# Patient Record
Sex: MALE | URBAN - METROPOLITAN AREA
[De-identification: names, ages, dates, MRNs, and addresses within clinical notes are randomized per-mention and may not be internally consistent; named-entity substitution may affect disease eponyms.]

---

## 2023-08-09 ENCOUNTER — APPOINTMENT (OUTPATIENT)
Dept: URBAN - METROPOLITAN AREA CLINIC 259 | Age: 83
Setting detail: DERMATOLOGY
End: 2023-08-09

## 2023-08-09 PROBLEM — C44.319 BASAL CELL CARCINOMA OF SKIN OF OTHER PARTS OF FACE: Status: ACTIVE | Noted: 2023-08-09

## 2023-08-09 PROBLEM — C44.311 BASAL CELL CARCINOMA OF SKIN OF NOSE: Status: ACTIVE | Noted: 2023-08-09

## 2023-08-09 PROCEDURE — OTHER MIPS QUALITY: OTHER

## 2023-08-09 PROCEDURE — 99203 OFFICE O/P NEW LOW 30 MIN: CPT

## 2023-08-09 PROCEDURE — OTHER DEFER: OTHER

## 2023-08-09 PROCEDURE — OTHER COUNSELING: OTHER

## 2023-08-09 NOTE — PROCEDURE: DEFER
Introduction Text (Please End With A Colon): The following procedure was deferred:
X Size Of Lesion In Cm (Optional): 0
Detail Level: Detailed
Other Procedure: SRT

## 2023-08-09 NOTE — PROCEDURE: MIPS QUALITY
Quality 111:Pneumonia Vaccination Status For Older Adults: Patient received any pneumococcal conjugate or polysaccharide vaccine on or after their 60th birthday and before the end of the measurement period
Detail Level: Detailed
Quality 226: Preventive Care And Screening: Tobacco Use: Screening And Cessation Intervention: Patient screened for tobacco use and is an ex/non-smoker
Quality 110: Preventive Care And Screening: Influenza Immunization: Influenza Immunization previously received during influenza season
Quality 431: Preventive Care And Screening: Unhealthy Alcohol Use - Screening: Patient not identified as an unhealthy alcohol user when screened for unhealthy alcohol use using a systematic screening method
Quality 130: Documentation Of Current Medications In The Medical Record: Current Medications Documented

## 2023-08-28 ENCOUNTER — APPOINTMENT (OUTPATIENT)
Dept: URBAN - METROPOLITAN AREA CLINIC 259 | Age: 83
Setting detail: DERMATOLOGY
End: 2023-08-29

## 2023-08-28 PROBLEM — C44.319 BASAL CELL CARCINOMA OF SKIN OF OTHER PARTS OF FACE: Status: ACTIVE | Noted: 2023-08-28

## 2023-08-28 PROBLEM — C44.311 BASAL CELL CARCINOMA OF SKIN OF NOSE: Status: ACTIVE | Noted: 2023-08-28

## 2023-08-28 PROBLEM — C44.399 OTHER SPECIFIED MALIGNANT NEOPLASM OF SKIN OF OTHER PARTS OF FACE: Status: ACTIVE | Noted: 2023-08-28

## 2023-08-28 PROBLEM — C44.01 BASAL CELL CARCINOMA OF SKIN OF LIP: Status: ACTIVE | Noted: 2023-08-28

## 2023-08-28 PROCEDURE — OTHER IMAGE GUIDED - SUPERFICIAL RADIOTHERAPY: TREATMENT VISIT: OTHER

## 2023-08-28 PROCEDURE — G6001 ECHO GUIDANCE RADIOTHERAPY: HCPCS

## 2023-08-28 PROCEDURE — 77300 RADIATION THERAPY DOSE PLAN: CPT

## 2023-08-28 PROCEDURE — 77290 THER RAD SIMULAJ FIELD CPLX: CPT

## 2023-08-28 PROCEDURE — OTHER IMAGE GUIDED - SUPERFICIAL RADIOTHERAPY: OTHER

## 2023-08-28 PROCEDURE — 77263 THER RADIOLOGY TX PLNG CPLX: CPT

## 2023-08-28 PROCEDURE — OTHER IMAGE GUIDED - SUPERFICIAL RADIOTHERAPY: EVALUATION VISIT: OTHER

## 2023-08-28 PROCEDURE — OTHER IMAGE GUIDED - SUPERFICIAL RADIOTHERAPY: SIMULATION VISIT: OTHER

## 2023-08-28 PROCEDURE — 77333 RADIATION TREATMENT AID(S): CPT

## 2023-08-28 PROCEDURE — 99213 OFFICE O/P EST LOW 20 MIN: CPT | Mod: 25

## 2023-08-28 NOTE — PROCEDURE: IMAGE GUIDED - SUPERFICIAL RADIOTHERAPY: TREATMENT VISIT
Calculate Total Cumulative Dose Automatically Or Manually: Manually
Show Ultrasound In Note?: Yes
Total Cumulative Dose (Cgy): 269.10
Additional Change To Daily Dosage Administered Mid Treatment?: No
Add X Modifier?: JURADO - Unusual Non-Overlapping Service
Ultrasound Used Text: High frequency ultrasound depth is mm, which is mm in difference from previous imaging.
Prescription Used: 1
Ultrasound Not Used Text: Ultrasound was not performed today due to
Treatment Documentation: This patient has been treated today with image-guided superficial radiation therapy for non-melanoma skin cancer. Written informed consent has been previously obtained from this patient for this treatment. This consent is documented in the patient's chart. The patient gave verbal consent to continue treatment today. The patient was treated with a specific radiation dose and setup as prescribed by the provider listed on this visit note.  A Radiation Therapist performed administration of radiation under the supervision of a provider. The treatment parameters and cumulative dose are indicated above. Prior to administering the radiation, the patient underwent a verification therapeutic radiology simulation-aided field setting defining relevant normal and abnormal target anatomy and acquiring images with a separate and distinct diagnostic high-frequency ultrasound to delineate tissues and determine whether to proceed with delivery of therapeutic, in addition to retrieve data necessary to develop an optimal radiation treatment process for the patient. The field placement simulation documents any change seen in overall tumor volume documented in the patient’s record, allows the clinician to indicate any needed changes in the treatment plan and/or prescription, provides diagnostic evaluation as the basis for performing the therapeutic procedure, and clearly identifies the information needed to decide to proceed with the therapeutic procedure. This process includes verification of the treatment port(s) and proper treatment positioning. All treatment ports were photographed within electronic medical records. The patient's lead blocking along with gross tumor volume and margin was confirmed. Considering superficial radiotherapy is clinical in setup, this requires the physician and radiation therapist to clarify the location interest being treated against initial images, ultrasound, pathology, and patient anatomy. Care was taken to ensure laboy treated were geometrically accurate and properly positioned using therapeutic radiology simulation-aided field setting verification per fraction. This process is also utilized to determine if any prescription or setup changes are necessary. These steps are therefore medically necessary to ensure safe and effective administration of radiation. Ongoing therapeutic radiology simulation-aided field setting verification is ordered throughout the course of therapy.  \\n\\nA high-frequency ultrasound image was acquired today for a two-dimensional evaluation of the tumor volume, depth, width, breadth, review of prior response to treatment, provide geometric accuracy of field placement, and determine whether to proceed with therapeutic delivery.\\n\\nThe field placement and ultrasound imaging, per fraction, is separate and distinct from the initial simulation and is an important task in providing safe administration of superficial radiation therapy. Physician evaluation of the ultrasound information will be ongoing throughout the course of treatment and is deemed medically necessary to ensure the efficacy of treatment, whether to proceed with therapeutic delivery, and determine any necessary changes. Today's images were evaluated for determination of continuation of treatment with the current plan or with necessary changes as appropriate. Additionally, the use of ultrasound visualization and targeted assessment allows the patient to be able to see his or her cancer(s) progress, encouraging the patient to complete and maintain compliance through the full course of prescribed radiotherapy. Per Dr. Ospina, continued ultrasound guidance and therapeutic radiology simulation-aided field setting verification per fraction is required for field placement, measurement of tumor depth, tissues evaluation, progress, acute effect monitoring, and determination for therapeutic treatment delivery is appropriate.
Energy (Kv): 100
Treatment Documentation: This patient has been treated today with image-guided superficial radiation therapy for non-melanoma skin cancer. Written informed consent has been previously obtained from this patient for this treatment. This consent is documented in the patient's chart. The patient gave verbal consent to continue treatment today. The patient was treated with a specific radiation dose and setup as prescribed by the provider listed on this visit note.  A Radiation Therapist performed administration of radiation under the supervision of a provider. The treatment parameters and cumulative dose are indicated above. Prior to administering the radiation, the patient underwent a verification therapeutic radiology simulation-aided field setting defining relevant normal and abnormal target anatomy and acquiring images with a separate and distinct diagnostic high-frequency ultrasound to delineate tissues and determine whether to proceed with delivery of therapeutic, in addition to retrieve data necessary to develop an optimal radiation treatment process for the patient. The field placement simulation documents any change seen in overall tumor volume documented in the patient’s record, allows the clinician to indicate any needed changes in the treatment plan and/or prescription, provides diagnostic evaluation as the basis for performing the therapeutic procedure, and clearly identifies the information needed to decide to proceed with the therapeutic procedure. This process includes verification of the treatment port(s) and proper treatment positioning. All treatment ports were photographed within electronic medical records. The patient's lead blocking along with gross tumor volume and margin was confirmed. Considering superficial radiotherapy is clinical in setup, this requires the physician and radiation therapist to clarify the location interest being treated against initial images, ultrasound, pathology, and patient anatomy. Care was taken to ensure laboy treated were geometrically accurate and properly positioned using therapeutic radiology simulation-aided field setting verification per fraction. This process is also utilized to determine if any prescription or setup changes are necessary. These steps are therefore medically necessary to ensure safe and effective administration of radiation. Ongoing therapeutic radiology simulation-aided field setting verification is ordered throughout the course of therapy.  \\n\\nA high-frequency ultrasound image was acquired today for a two-dimensional evaluation of the tumor volume, depth, width, breadth, review of prior response to treatment, provide geometric accuracy of field placement, and determine whether to proceed with therapeutic delivery.\\n\\nThe field placement and ultrasound imaging, per fraction, is separate and distinct from the initial simulation and is an important task in providing safe administration of superficial radiation therapy. Physician evaluation of the ultrasound information will be ongoing throughout the course of treatment and is deemed medically necessary to ensure the efficacy of treatment, whether to proceed with therapeutic delivery, and determine any necessary changes. Today's images were evaluated for determination of continuation of treatment with the current plan or with necessary changes as appropriate. Additionally, the use of ultrasound visualization and targeted assessment allows the patient to be able to see his or her cancer(s) progress, encouraging the patient to complete and maintain compliance through the full course of prescribed radiotherapy. Per Dr. Ospina, continued ultrasound guidance and therapeutic radiology simulation-aided field setting verification per fraction is required for field placement, measurement of tumor depth, tissues evaluation, progress, acute effect monitoring, and determination for therapeutic treatment delivery is appropriate.
Daily Dosage (Cgy): 267.46
Daily Dosage (Cgy): 264.04
Energy (Kv): 70
Treatment Documentation: This patient has been treated today with image-guided superficial radiation therapy for non-melanoma skin cancer. Written informed consent has been previously obtained from this patient for this treatment. This consent is documented in the patient's chart. The patient gave verbal consent to continue treatment today. The patient was treated with a specific radiation dose and setup as prescribed by the provider listed on this visit note.  A Radiation Therapist performed administration of radiation under the supervision of a provider. The treatment parameters and cumulative dose are indicated above. Prior to administering the radiation, the patient underwent a verification therapeutic radiology simulation-aided field setting defining relevant normal and abnormal target anatomy and acquiring images with a separate and distinct diagnostic high-frequency ultrasound to delineate tissues and determine whether to proceed with delivery of therapeutic, in addition to retrieve data necessary to develop an optimal radiation treatment process for the patient. The field placement simulation documents any change seen in overall tumor volume documented in the patient’s record, allows the clinician to indicate any needed changes in the treatment plan and/or prescription, provides diagnostic evaluation as the basis for performing the therapeutic procedure, and clearly identifies the information needed to decide to proceed with the therapeutic procedure. This process includes verification of the treatment port(s) and proper treatment positioning. All treatment ports were photographed within electronic medical records. The patient's lead blocking along with gross tumor volume and margin was confirmed. Considering superficial radiotherapy is clinical in setup, this requires the physician and radiation therapist to clarify the location interest being treated against initial images, ultrasound, pathology, and patient anatomy. Care was taken to ensure laboy treated were geometrically accurate and properly positioned using therapeutic radiology simulation-aided field setting verification per fraction. This process is also utilized to determine if any prescription or setup changes are necessary. These steps are therefore medically necessary to ensure safe and effective administration of radiation. Ongoing therapeutic radiology simulation-aided field setting verification is ordered throughout the course of therapy.  \\n\\nA high-frequency ultrasound image was acquired today for a two-dimensional evaluation of the tumor volume, depth, width, breadth, review of prior response to treatment, provide geometric accuracy of field placement, and determine whether to proceed with therapeutic delivery.\\n\\nThe field placement and ultrasound imaging, per fraction, is separate and distinct from the initial simulation and is an important task in providing safe administration of superficial radiation therapy. Physician evaluation of the ultrasound information will be ongoing throughout the course of treatment and is deemed medically necessary to ensure the efficacy of treatment, whether to proceed with therapeutic delivery, and determine any necessary changes. Today's images were evaluated for determination of continuation of treatment with the current plan or with necessary changes as appropriate. Additionally, the use of ultrasound visualization and targeted assessment allows the patient to be able to see his or her cancer(s) progress, encouraging the patient to complete and maintain compliance through the full course of prescribed radiotherapy. Per Dr. Ospina, continued ultrasound guidance and therapeutic radiology simulation-aided field setting verification per fraction is required for field placement, measurement of tumor depth, tissues evaluation, progress, acute effect monitoring, and determination for therapeutic treatment delivery is appropriate.

## 2023-08-28 NOTE — PROCEDURE: IMAGE GUIDED - SUPERFICIAL RADIOTHERAPY: SIMULATION VISIT
Simulation Documentation: Per the request of Dr. Ospina, the patient was seen today for Superficial Radiation Therapy planning requiring initial simulation in preparation for treatment of specific diseased sites. Simulation is necessary to determine the correct patient and treatment portal positioning, to deliver safe and effective radiotherapy. A high-frequency ultrasound image was acquired prior to treatment today for two- dimensional evaluation of tumor volume and will be repeated per fraction for response to treatment, in addition, to geometric accuracy of field placement. Physician evaluation of the ultrasound tumor depth, width, and breadth will be ongoing through the course of treatment and is deemed medically necessary ensuring efficacy of treatment and determine whether to proceed with delivery of therapeutic. Today’s image and setup were evaluated to determine the initiation of treatment. All appropriate blocking and treatment parameters were verified by the radiation therapist and provider.  \\n\\nPer Dr. Ospina, continued per fraction ultrasound guidance and simulation are required for field placement, measurement of tumor depth, width and breadth, progress, and edema monitoring.   \\n\\nPer the request of Dr. Ospina, continuing medical physics review as per radiotherapy standard of care post every 5th fraction for the patient, including assessment of treatment parameters,  of dose delivery, and review of patient treatment documentation in support of the provider, is ordered, ensuring efficacy and continued safe delivery of radiotherapy. Included in the physics check is a review of patient setup information, all pertinent simulation and treatment photograph(s) checks, prescription, dose calculation verification, per fraction dose charted correctly, elapsed days and treatment days correctly charted, cumulative dose correct, and review of any prescription changes. Continued medical physics review post every 5th fraction of radiotherapy is requested by the provider for appropriate radiotherapy management and is deemed medically necessary and standard of care.

## 2023-08-28 NOTE — PROCEDURE: IMAGE GUIDED - SUPERFICIAL RADIOTHERAPY: EVALUATION VISIT
Was Ultrasound Performed Today?: No
Ultrasound Used Text: Ultrasound depth is mm.
Assessment: Appropriate reaction
Evaluation Plan: The patient is undergoing superficial radiation therapy for skin cancer and presents for weekly evaluation and management. Per protocol and as documented on the flow sheet, the patient was questioned as to subjective redness, pruritus, pain, drainage, fatigue, or any other symptoms. Objectively, the radiation area was evaluated with regards to erythema, atrophy, scale, crusting, erosion, ulceration, edema, purpura, tenderness, warmth, drainage, and any other findings. The plan was extensively reviewed including dose and dosing schedule. The simulation and clinical setup were also reviewed as were external and any internal shields and based on this review the appropriateness and sufficiency of treatment was determined.
Is This Visit For Evaluation During Treatment Or Follow Up Post Treatment?: evaluation
Radiation Therapy Oncology Group (Rtog) Score: 0
Ultrasound Not Used Text: Ultrasound was not performed today due to

## 2023-08-28 NOTE — PROCEDURE: IMAGE GUIDED - SUPERFICIAL RADIOTHERAPY: SIMULATION VISIT
Bill For Treatment Planning: Yes- (Complex Planning- 3 Sites or more: [id number]) Bill For Treatment Planning: Yes- (Complex Planning- 3 Sites or more: [id number])

## 2023-08-28 NOTE — PROCEDURE: IMAGE GUIDED - SUPERFICIAL RADIOTHERAPY
Body Location Override (Optional): Left Nasal Ala
Detail Level: Detailed
Pathology Override (Pathology Will Render As Diagnosis Name If Left Blank): Primary Infiltrative Basal Cell Carcinoma
Field Number: 1
Lesion Dimensions-X Axis In Cm: 0.8
Lesion Dimensions-Y Axis In Cm: 0.9
Lesion Margin Size In Cm: 0.5
Applicator Size In Cm: 1.5 cm
Energy (Kv): 100
Treatment Time (Min): 0.46
Time, Dose, Fractionation Factor (Tdf) For Prescription 1: 99
Daily Dose (Cgy): 269.10
Number Of Fractions For Prescription 1: 20
Treatments Per Week: 4
Total Dose For Prescription 1 (Cgy): 5382.00
Add A Second Prescription?: No
Additional Fraction(S) Needed:: 0
Bill For Physics Consultation: No - Render Text Only
Physics Documentation: (Physics Check Verbiage)
Body Location Override (Optional): Left Central Zygoma
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
Field Number: 2
Lesion Dimensions-X Axis In Cm: 0.7
Shield Size In Cm: 1.7 X 1.7
Applicator Size In Cm: 2.5 cm
Treatment Time (Min): 0.43
Time, Dose, Fractionation Factor (Tdf) For Prescription 1: 98
Daily Dose (Cgy): 267.46
Total Dose For Prescription 1 (Cgy): 5349.20
Body Location Override (Optional): Left Superior Medial Forehead
Pathology Override (Pathology Will Render As Diagnosis Name If Left Blank): Primary Nodular Basal Cell Carcinoma
Field Number: 3
Shield Size In Cm: 2.0 x 2.0
Energy (Kv): 70
Treatment Time (Min): 0.41
Time, Dose, Fractionation Factor (Tdf) For Prescription 1: 96
Daily Dose (Cgy): 264.04
Total Dose For Prescription 1 (Cgy): 5280.80
Body Location Override (Optional): Right Nasolabial Fold

## 2023-08-29 ENCOUNTER — APPOINTMENT (OUTPATIENT)
Dept: URBAN - METROPOLITAN AREA CLINIC 259 | Age: 83
Setting detail: DERMATOLOGY
End: 2023-08-31

## 2023-08-29 PROBLEM — C44.01 BASAL CELL CARCINOMA OF SKIN OF LIP: Status: ACTIVE | Noted: 2023-08-29

## 2023-08-29 PROBLEM — C44.311 BASAL CELL CARCINOMA OF SKIN OF NOSE: Status: ACTIVE | Noted: 2023-08-29

## 2023-08-29 PROBLEM — C44.319 BASAL CELL CARCINOMA OF SKIN OF OTHER PARTS OF FACE: Status: ACTIVE | Noted: 2023-08-29

## 2023-08-29 PROBLEM — C44.399 OTHER SPECIFIED MALIGNANT NEOPLASM OF SKIN OF OTHER PARTS OF FACE: Status: ACTIVE | Noted: 2023-08-29

## 2023-08-29 PROCEDURE — OTHER IMAGE GUIDED - SUPERFICIAL RADIOTHERAPY: TREATMENT VISIT: OTHER

## 2023-08-29 PROCEDURE — G6001 ECHO GUIDANCE RADIOTHERAPY: HCPCS | Mod: XU

## 2023-08-29 PROCEDURE — 77401 RADIATION TX DELIVERY SUPFC: CPT

## 2023-08-29 PROCEDURE — 77280 THER RAD SIMULAJ FIELD SMPL: CPT

## 2023-08-29 PROCEDURE — OTHER IMAGE GUIDED - SUPERFICIAL RADIOTHERAPY: OTHER

## 2023-08-29 NOTE — PROCEDURE: IMAGE GUIDED - SUPERFICIAL RADIOTHERAPY: TREATMENT VISIT
Calculate Total Cumulative Dose Automatically Or Manually: Manually
Show Ultrasound In Note?: Yes
Total Cumulative Dose (Cgy): 269.10
Additional Change To Daily Dosage Administered Mid Treatment?: No
Add X Modifier?: JURADO - Unusual Non-Overlapping Service
Ultrasound Used Text: High frequency ultrasound depth is 2.52 mm, which is 0.06 mm in difference from previous imaging.
Prescription Used: 1
Ultrasound Not Used Text: Ultrasound was not performed today due to
Treatment Documentation: This patient has been treated today with image-guided superficial radiation therapy for non-melanoma skin cancer. Written informed consent has been previously obtained from this patient for this treatment. This consent is documented in the patient's chart. The patient gave verbal consent to continue treatment today. The patient was treated with a specific radiation dose and setup as prescribed by the provider listed on this visit note.  A Radiation Therapist performed administration of radiation under the supervision of a provider. The treatment parameters and cumulative dose are indicated above. Prior to administering the radiation, the patient underwent a verification therapeutic radiology simulation-aided field setting defining relevant normal and abnormal target anatomy and acquiring images with a separate and distinct diagnostic high-frequency ultrasound to delineate tissues and determine whether to proceed with delivery of therapeutic, in addition to retrieve data necessary to develop an optimal radiation treatment process for the patient. The field placement simulation documents any change seen in overall tumor volume documented in the patient’s record, allows the clinician to indicate any needed changes in the treatment plan and/or prescription, provides diagnostic evaluation as the basis for performing the therapeutic procedure, and clearly identifies the information needed to decide to proceed with the therapeutic procedure. This process includes verification of the treatment port(s) and proper treatment positioning. All treatment ports were photographed within electronic medical records. The patient's lead blocking along with gross tumor volume and margin was confirmed. Considering superficial radiotherapy is clinical in setup, this requires the physician and radiation therapist to clarify the location interest being treated against initial images, ultrasound, pathology, and patient anatomy. Care was taken to ensure laboy treated were geometrically accurate and properly positioned using therapeutic radiology simulation-aided field setting verification per fraction. This process is also utilized to determine if any prescription or setup changes are necessary. These steps are therefore medically necessary to ensure safe and effective administration of radiation. Ongoing therapeutic radiology simulation-aided field setting verification is ordered throughout the course of therapy.  \\n\\nA high-frequency ultrasound image was acquired today for a two-dimensional evaluation of the tumor volume, depth, width, breadth, review of prior response to treatment, provide geometric accuracy of field placement, and determine whether to proceed with therapeutic delivery.\\n\\nThe field placement and ultrasound imaging, per fraction, is separate and distinct from the initial simulation and is an important task in providing safe administration of superficial radiation therapy. Physician evaluation of the ultrasound information will be ongoing throughout the course of treatment and is deemed medically necessary to ensure the efficacy of treatment, whether to proceed with therapeutic delivery, and determine any necessary changes. Today's images were evaluated for determination of continuation of treatment with the current plan or with necessary changes as appropriate. Additionally, the use of ultrasound visualization and targeted assessment allows the patient to be able to see his or her cancer(s) progress, encouraging the patient to complete and maintain compliance through the full course of prescribed radiotherapy. Per Dr. Ospina, continued ultrasound guidance and therapeutic radiology simulation-aided field setting verification per fraction is required for field placement, measurement of tumor depth, tissues evaluation, progress, acute effect monitoring, and determination for therapeutic treatment delivery is appropriate.
Energy (Kv): 100
Additional Comments (Add Customization Of Note Here): The patient was asked if he has any questions regarding the treatment process. The patient stated that he did not have any questions at this time.
Treatment Documentation: This patient has been treated today with image-guided superficial radiation therapy for non-melanoma skin cancer. Written informed consent has been previously obtained from this patient for this treatment. This consent is documented in the patient's chart. The patient gave verbal consent to continue treatment today. The patient was treated with a specific radiation dose and setup as prescribed by the provider listed on this visit note.  A Radiation Therapist performed administration of radiation under the supervision of a provider. The treatment parameters and cumulative dose are indicated above. Prior to administering the radiation, the patient underwent a verification therapeutic radiology simulation-aided field setting defining relevant normal and abnormal target anatomy and acquiring images with a separate and distinct diagnostic high-frequency ultrasound to delineate tissues and determine whether to proceed with delivery of therapeutic, in addition to retrieve data necessary to develop an optimal radiation treatment process for the patient. The field placement simulation documents any change seen in overall tumor volume documented in the patient’s record, allows the clinician to indicate any needed changes in the treatment plan and/or prescription, provides diagnostic evaluation as the basis for performing the therapeutic procedure, and clearly identifies the information needed to decide to proceed with the therapeutic procedure. This process includes verification of the treatment port(s) and proper treatment positioning. All treatment ports were photographed within electronic medical records. The patient's lead blocking along with gross tumor volume and margin was confirmed. Considering superficial radiotherapy is clinical in setup, this requires the physician and radiation therapist to clarify the location interest being treated against initial images, ultrasound, pathology, and patient anatomy. Care was taken to ensure laboy treated were geometrically accurate and properly positioned using therapeutic radiology simulation-aided field setting verification per fraction. This process is also utilized to determine if any prescription or setup changes are necessary. These steps are therefore medically necessary to ensure safe and effective administration of radiation. Ongoing therapeutic radiology simulation-aided field setting verification is ordered throughout the course of therapy.  \\n\\nA high-frequency ultrasound image was acquired today for a two-dimensional evaluation of the tumor volume, depth, width, breadth, review of prior response to treatment, provide geometric accuracy of field placement, and determine whether to proceed with therapeutic delivery.\\n\\nThe field placement and ultrasound imaging, per fraction, is separate and distinct from the initial simulation and is an important task in providing safe administration of superficial radiation therapy. Physician evaluation of the ultrasound information will be ongoing throughout the course of treatment and is deemed medically necessary to ensure the efficacy of treatment, whether to proceed with therapeutic delivery, and determine any necessary changes. Today's images were evaluated for determination of continuation of treatment with the current plan or with necessary changes as appropriate. Additionally, the use of ultrasound visualization and targeted assessment allows the patient to be able to see his or her cancer(s) progress, encouraging the patient to complete and maintain compliance through the full course of prescribed radiotherapy. Per Dr. Ospina, continued ultrasound guidance and therapeutic radiology simulation-aided field setting verification per fraction is required for field placement, measurement of tumor depth, tissues evaluation, progress, acute effect monitoring, and determination for therapeutic treatment delivery is appropriate.
Daily Dosage (Cgy): 267.46
Ultrasound Used Text: High frequency ultrasound depth is 1.11 mm, which is 0.30 mm in difference from previous imaging.
Daily Dosage (Cgy): 264.04
Energy (Kv): 70
Ultrasound Used Text: High frequency ultrasound depth is 0.96 mm, which is 0.07 mm in difference from previous imaging.
Treatment Documentation: This patient has been treated today with image-guided superficial radiation therapy for non-melanoma skin cancer. Written informed consent has been previously obtained from this patient for this treatment. This consent is documented in the patient's chart. The patient gave verbal consent to continue treatment today. The patient was treated with a specific radiation dose and setup as prescribed by the provider listed on this visit note.  A Radiation Therapist performed administration of radiation under the supervision of a provider. The treatment parameters and cumulative dose are indicated above. Prior to administering the radiation, the patient underwent a verification therapeutic radiology simulation-aided field setting defining relevant normal and abnormal target anatomy and acquiring images with a separate and distinct diagnostic high-frequency ultrasound to delineate tissues and determine whether to proceed with delivery of therapeutic, in addition to retrieve data necessary to develop an optimal radiation treatment process for the patient. The field placement simulation documents any change seen in overall tumor volume documented in the patient’s record, allows the clinician to indicate any needed changes in the treatment plan and/or prescription, provides diagnostic evaluation as the basis for performing the therapeutic procedure, and clearly identifies the information needed to decide to proceed with the therapeutic procedure. This process includes verification of the treatment port(s) and proper treatment positioning. All treatment ports were photographed within electronic medical records. The patient's lead blocking along with gross tumor volume and margin was confirmed. Considering superficial radiotherapy is clinical in setup, this requires the physician and radiation therapist to clarify the location interest being treated against initial images, ultrasound, pathology, and patient anatomy. Care was taken to ensure laboy treated were geometrically accurate and properly positioned using therapeutic radiology simulation-aided field setting verification per fraction. This process is also utilized to determine if any prescription or setup changes are necessary. These steps are therefore medically necessary to ensure safe and effective administration of radiation. Ongoing therapeutic radiology simulation-aided field setting verification is ordered throughout the course of therapy.  \\n\\nA high-frequency ultrasound image was acquired today for a two-dimensional evaluation of the tumor volume, depth, width, breadth, review of prior response to treatment, provide geometric accuracy of field placement, and determine whether to proceed with therapeutic delivery.\\n\\nThe field placement and ultrasound imaging, per fraction, is separate and distinct from the initial simulation and is an important task in providing safe administration of superficial radiation therapy. Physician evaluation of the ultrasound information will be ongoing throughout the course of treatment and is deemed medically necessary to ensure the efficacy of treatment, whether to proceed with therapeutic delivery, and determine any necessary changes. Today's images were evaluated for determination of continuation of treatment with the current plan or with necessary changes as appropriate. Additionally, the use of ultrasound visualization and targeted assessment allows the patient to be able to see his or her cancer(s) progress, encouraging the patient to complete and maintain compliance through the full course of prescribed radiotherapy. Per Dr. Ospina, continued ultrasound guidance and therapeutic radiology simulation-aided field setting verification per fraction is required for field placement, measurement of tumor depth, tissues evaluation, progress, acute effect monitoring, and determination for therapeutic treatment delivery is appropriate.
Treatment Documentation: This patient has been treated today with image-guided superficial radiation therapy for non-melanoma skin cancer. Written informed consent has been previously obtained from this patient for this treatment. This consent is documented in the patient's chart. The patient gave verbal consent to continue treatment today. The patient was treated with a specific radiation dose and setup as prescribed by the provider listed on this visit note.  A Radiation Therapist performed administration of radiation under the supervision of a provider. The treatment parameters and cumulative dose are indicated above. Prior to administering the radiation, the patient underwent a verification therapeutic radiology simulation-aided field setting defining relevant normal and abnormal target anatomy and acquiring images with a separate and distinct diagnostic high-frequency ultrasound to delineate tissues and determine whether to proceed with delivery of therapeutic, in addition to retrieve data necessary to develop an optimal radiation treatment process for the patient. The field placement simulation documents any change seen in overall tumor volume documented in the patient’s record, allows the clinician to indicate any needed changes in the treatment plan and/or prescription, provides diagnostic evaluation as the basis for performing the therapeutic procedure, and clearly identifies the information needed to decide to proceed with the therapeutic procedure. This process includes verification of the treatment port(s) and proper treatment positioning. All treatment ports were photographed within electronic medical records. The patient's lead blocking along with gross tumor volume and margin was confirmed. Considering superficial radiotherapy is clinical in setup, this requires the physician and radiation therapist to clarify the location interest being treated against initial images, ultrasound, pathology, and patient anatomy. Care was taken to ensure laboy treated were geometrically accurate and properly positioned using therapeutic radiology simulation-aided field setting verification per fraction. This process is also utilized to determine if any prescription or setup changes are necessary. These steps are therefore medically necessary to ensure safe and effective administration of radiation. Ongoing therapeutic radiology simulation-aided field setting verification is ordered throughout the course of therapy.  \\n\\nA high-frequency ultrasound image was acquired today for a two-dimensional evaluation of the tumor volume, depth, width, breadth, review of prior response to treatment, provide geometric accuracy of field placement, and determine whether to proceed with therapeutic delivery.\\n\\nThe field placement and ultrasound imaging, per fraction, is separate and distinct from the initial simulation and is an important task in providing safe administration of superficial radiation therapy. Physician evaluation of the ultrasound information will be ongoing throughout the course of treatment and is deemed medically necessary to ensure the efficacy of treatment, whether to proceed with therapeutic delivery, and determine any necessary changes. Today's images were evaluated for determination of continuation of treatment with the current plan or with necessary changes as appropriate. Additionally, the use of ultrasound visualization and targeted assessment allows the patient to be able to see his or her cancer(s) progress, encouraging the patient to complete and maintain compliance through the full course of prescribed radiotherapy. Per Dr. Ospina, continued ultrasound guidance and therapeutic radiology simulation-aided field setting verification per fraction is required for field placement, measurement of tumor depth, tissues evaluation, progress, acute effect monitoring, and determination for therapeutic treatment delivery is appropriate.
Ultrasound Used Text: High frequency ultrasound depth is 1.76 mm, which is 0.26 mm in difference from previous imaging.

## 2023-08-30 ENCOUNTER — APPOINTMENT (OUTPATIENT)
Dept: URBAN - METROPOLITAN AREA CLINIC 259 | Age: 83
Setting detail: DERMATOLOGY
End: 2023-08-31

## 2023-08-30 PROBLEM — C44.311 BASAL CELL CARCINOMA OF SKIN OF NOSE: Status: ACTIVE | Noted: 2023-08-30

## 2023-08-30 PROBLEM — C44.01 BASAL CELL CARCINOMA OF SKIN OF LIP: Status: ACTIVE | Noted: 2023-08-30

## 2023-08-30 PROBLEM — C44.399 OTHER SPECIFIED MALIGNANT NEOPLASM OF SKIN OF OTHER PARTS OF FACE: Status: ACTIVE | Noted: 2023-08-30

## 2023-08-30 PROBLEM — C44.319 BASAL CELL CARCINOMA OF SKIN OF OTHER PARTS OF FACE: Status: ACTIVE | Noted: 2023-08-30

## 2023-08-30 PROCEDURE — OTHER IMAGE GUIDED - SUPERFICIAL RADIOTHERAPY: TREATMENT VISIT: OTHER

## 2023-08-30 PROCEDURE — G6001 ECHO GUIDANCE RADIOTHERAPY: HCPCS | Mod: XU

## 2023-08-30 PROCEDURE — 77280 THER RAD SIMULAJ FIELD SMPL: CPT

## 2023-08-30 PROCEDURE — 77401 RADIATION TX DELIVERY SUPFC: CPT

## 2023-08-30 PROCEDURE — OTHER IMAGE GUIDED - SUPERFICIAL RADIOTHERAPY: OTHER

## 2023-08-30 NOTE — PROCEDURE: IMAGE GUIDED - SUPERFICIAL RADIOTHERAPY: TREATMENT VISIT
Calculate Total Cumulative Dose Automatically Or Manually: Manually
Show Ultrasound In Note?: Yes
Total Cumulative Dose (Cgy): 538.20
Additional Change To Daily Dosage Administered Mid Treatment?: No
Add X Modifier?: JURADO - Unusual Non-Overlapping Service
Ultrasound Used Text: High frequency ultrasound depth is 2.02 mm, which is 0.50 mm in difference from previous imaging.
Prescription Used: 1
Ultrasound Not Used Text: Ultrasound was not performed today due to
Fraction Number: 2
Treatment Documentation: This patient has been treated today with image-guided superficial radiation therapy for non-melanoma skin cancer. Written informed consent has been previously obtained from this patient for this treatment. This consent is documented in the patient's chart. The patient gave verbal consent to continue treatment today. The patient was treated with a specific radiation dose and setup as prescribed by the provider listed on this visit note.  A Radiation Therapist performed administration of radiation under the supervision of a provider. The treatment parameters and cumulative dose are indicated above. Prior to administering the radiation, the patient underwent a verification therapeutic radiology simulation-aided field setting defining relevant normal and abnormal target anatomy and acquiring images with a separate and distinct diagnostic high-frequency ultrasound to delineate tissues and determine whether to proceed with delivery of therapeutic, in addition to retrieve data necessary to develop an optimal radiation treatment process for the patient. The field placement simulation documents any change seen in overall tumor volume documented in the patient’s record, allows the clinician to indicate any needed changes in the treatment plan and/or prescription, provides diagnostic evaluation as the basis for performing the therapeutic procedure, and clearly identifies the information needed to decide to proceed with the therapeutic procedure. This process includes verification of the treatment port(s) and proper treatment positioning. All treatment ports were photographed within electronic medical records. The patient's lead blocking along with gross tumor volume and margin was confirmed. Considering superficial radiotherapy is clinical in setup, this requires the physician and radiation therapist to clarify the location interest being treated against initial images, ultrasound, pathology, and patient anatomy. Care was taken to ensure laboy treated were geometrically accurate and properly positioned using therapeutic radiology simulation-aided field setting verification per fraction. This process is also utilized to determine if any prescription or setup changes are necessary. These steps are therefore medically necessary to ensure safe and effective administration of radiation. Ongoing therapeutic radiology simulation-aided field setting verification is ordered throughout the course of therapy.  \\n\\nA high-frequency ultrasound image was acquired today for a two-dimensional evaluation of the tumor volume, depth, width, breadth, review of prior response to treatment, provide geometric accuracy of field placement, and determine whether to proceed with therapeutic delivery.\\n\\nThe field placement and ultrasound imaging, per fraction, is separate and distinct from the initial simulation and is an important task in providing safe administration of superficial radiation therapy. Physician evaluation of the ultrasound information will be ongoing throughout the course of treatment and is deemed medically necessary to ensure the efficacy of treatment, whether to proceed with therapeutic delivery, and determine any necessary changes. Today's images were evaluated for determination of continuation of treatment with the current plan or with necessary changes as appropriate. Additionally, the use of ultrasound visualization and targeted assessment allows the patient to be able to see his or her cancer(s) progress, encouraging the patient to complete and maintain compliance through the full course of prescribed radiotherapy. Per Dr. Ospina, continued ultrasound guidance and therapeutic radiology simulation-aided field setting verification per fraction is required for field placement, measurement of tumor depth, tissues evaluation, progress, acute effect monitoring, and determination for therapeutic treatment delivery is appropriate.
Energy (Kv): 100
Daily Dosage (Cgy): 269.10
Additional Comments (Add Customization Of Note Here): The patient stated he started using Vaseline on his treatment site yesterday. I encouraged him to use Vaseline daily on the treatment sites.
Treatment Documentation: This patient has been treated today with image-guided superficial radiation therapy for non-melanoma skin cancer. Written informed consent has been previously obtained from this patient for this treatment. This consent is documented in the patient's chart. The patient gave verbal consent to continue treatment today. The patient was treated with a specific radiation dose and setup as prescribed by the provider listed on this visit note.  A Radiation Therapist performed administration of radiation under the supervision of a provider. The treatment parameters and cumulative dose are indicated above. Prior to administering the radiation, the patient underwent a verification therapeutic radiology simulation-aided field setting defining relevant normal and abnormal target anatomy and acquiring images with a separate and distinct diagnostic high-frequency ultrasound to delineate tissues and determine whether to proceed with delivery of therapeutic, in addition to retrieve data necessary to develop an optimal radiation treatment process for the patient. The field placement simulation documents any change seen in overall tumor volume documented in the patient’s record, allows the clinician to indicate any needed changes in the treatment plan and/or prescription, provides diagnostic evaluation as the basis for performing the therapeutic procedure, and clearly identifies the information needed to decide to proceed with the therapeutic procedure. This process includes verification of the treatment port(s) and proper treatment positioning. All treatment ports were photographed within electronic medical records. The patient's lead blocking along with gross tumor volume and margin was confirmed. Considering superficial radiotherapy is clinical in setup, this requires the physician and radiation therapist to clarify the location interest being treated against initial images, ultrasound, pathology, and patient anatomy. Care was taken to ensure laboy treated were geometrically accurate and properly positioned using therapeutic radiology simulation-aided field setting verification per fraction. This process is also utilized to determine if any prescription or setup changes are necessary. These steps are therefore medically necessary to ensure safe and effective administration of radiation. Ongoing therapeutic radiology simulation-aided field setting verification is ordered throughout the course of therapy.  \\n\\nA high-frequency ultrasound image was acquired today for a two-dimensional evaluation of the tumor volume, depth, width, breadth, review of prior response to treatment, provide geometric accuracy of field placement, and determine whether to proceed with therapeutic delivery.\\n\\nThe field placement and ultrasound imaging, per fraction, is separate and distinct from the initial simulation and is an important task in providing safe administration of superficial radiation therapy. Physician evaluation of the ultrasound information will be ongoing throughout the course of treatment and is deemed medically necessary to ensure the efficacy of treatment, whether to proceed with therapeutic delivery, and determine any necessary changes. Today's images were evaluated for determination of continuation of treatment with the current plan or with necessary changes as appropriate. Additionally, the use of ultrasound visualization and targeted assessment allows the patient to be able to see his or her cancer(s) progress, encouraging the patient to complete and maintain compliance through the full course of prescribed radiotherapy. Per Dr. Ospina, continued ultrasound guidance and therapeutic radiology simulation-aided field setting verification per fraction is required for field placement, measurement of tumor depth, tissues evaluation, progress, acute effect monitoring, and determination for therapeutic treatment delivery is appropriate.
Daily Dosage (Cgy): 267.46
Total Cumulative Dose (Cgy): 534.92
Ultrasound Used Text: High frequency ultrasound depth is 1.30 mm, which is 0.19 mm in difference from previous imaging.
Daily Dosage (Cgy): 264.04
Energy (Kv): 70
Total Cumulative Dose (Cgy): 528.08
Ultrasound Used Text: High frequency ultrasound depth is 0.90 mm, which is 0.06 mm in difference from previous imaging.
Treatment Documentation: This patient has been treated today with image-guided superficial radiation therapy for non-melanoma skin cancer. Written informed consent has been previously obtained from this patient for this treatment. This consent is documented in the patient's chart. The patient gave verbal consent to continue treatment today. The patient was treated with a specific radiation dose and setup as prescribed by the provider listed on this visit note.  A Radiation Therapist performed administration of radiation under the supervision of a provider. The treatment parameters and cumulative dose are indicated above. Prior to administering the radiation, the patient underwent a verification therapeutic radiology simulation-aided field setting defining relevant normal and abnormal target anatomy and acquiring images with a separate and distinct diagnostic high-frequency ultrasound to delineate tissues and determine whether to proceed with delivery of therapeutic, in addition to retrieve data necessary to develop an optimal radiation treatment process for the patient. The field placement simulation documents any change seen in overall tumor volume documented in the patient’s record, allows the clinician to indicate any needed changes in the treatment plan and/or prescription, provides diagnostic evaluation as the basis for performing the therapeutic procedure, and clearly identifies the information needed to decide to proceed with the therapeutic procedure. This process includes verification of the treatment port(s) and proper treatment positioning. All treatment ports were photographed within electronic medical records. The patient's lead blocking along with gross tumor volume and margin was confirmed. Considering superficial radiotherapy is clinical in setup, this requires the physician and radiation therapist to clarify the location interest being treated against initial images, ultrasound, pathology, and patient anatomy. Care was taken to ensure laboy treated were geometrically accurate and properly positioned using therapeutic radiology simulation-aided field setting verification per fraction. This process is also utilized to determine if any prescription or setup changes are necessary. These steps are therefore medically necessary to ensure safe and effective administration of radiation. Ongoing therapeutic radiology simulation-aided field setting verification is ordered throughout the course of therapy.  \\n\\nA high-frequency ultrasound image was acquired today for a two-dimensional evaluation of the tumor volume, depth, width, breadth, review of prior response to treatment, provide geometric accuracy of field placement, and determine whether to proceed with therapeutic delivery.\\n\\nThe field placement and ultrasound imaging, per fraction, is separate and distinct from the initial simulation and is an important task in providing safe administration of superficial radiation therapy. Physician evaluation of the ultrasound information will be ongoing throughout the course of treatment and is deemed medically necessary to ensure the efficacy of treatment, whether to proceed with therapeutic delivery, and determine any necessary changes. Today's images were evaluated for determination of continuation of treatment with the current plan or with necessary changes as appropriate. Additionally, the use of ultrasound visualization and targeted assessment allows the patient to be able to see his or her cancer(s) progress, encouraging the patient to complete and maintain compliance through the full course of prescribed radiotherapy. Per Dr. Ospina, continued ultrasound guidance and therapeutic radiology simulation-aided field setting verification per fraction is required for field placement, measurement of tumor depth, tissues evaluation, progress, acute effect monitoring, and determination for therapeutic treatment delivery is appropriate.
Treatment Documentation: This patient has been treated today with image-guided superficial radiation therapy for non-melanoma skin cancer. Written informed consent has been previously obtained from this patient for this treatment. This consent is documented in the patient's chart. The patient gave verbal consent to continue treatment today. The patient was treated with a specific radiation dose and setup as prescribed by the provider listed on this visit note.  A Radiation Therapist performed administration of radiation under the supervision of a provider. The treatment parameters and cumulative dose are indicated above. Prior to administering the radiation, the patient underwent a verification therapeutic radiology simulation-aided field setting defining relevant normal and abnormal target anatomy and acquiring images with a separate and distinct diagnostic high-frequency ultrasound to delineate tissues and determine whether to proceed with delivery of therapeutic, in addition to retrieve data necessary to develop an optimal radiation treatment process for the patient. The field placement simulation documents any change seen in overall tumor volume documented in the patient’s record, allows the clinician to indicate any needed changes in the treatment plan and/or prescription, provides diagnostic evaluation as the basis for performing the therapeutic procedure, and clearly identifies the information needed to decide to proceed with the therapeutic procedure. This process includes verification of the treatment port(s) and proper treatment positioning. All treatment ports were photographed within electronic medical records. The patient's lead blocking along with gross tumor volume and margin was confirmed. Considering superficial radiotherapy is clinical in setup, this requires the physician and radiation therapist to clarify the location interest being treated against initial images, ultrasound, pathology, and patient anatomy. Care was taken to ensure laboy treated were geometrically accurate and properly positioned using therapeutic radiology simulation-aided field setting verification per fraction. This process is also utilized to determine if any prescription or setup changes are necessary. These steps are therefore medically necessary to ensure safe and effective administration of radiation. Ongoing therapeutic radiology simulation-aided field setting verification is ordered throughout the course of therapy.  \\n\\nA high-frequency ultrasound image was acquired today for a two-dimensional evaluation of the tumor volume, depth, width, breadth, review of prior response to treatment, provide geometric accuracy of field placement, and determine whether to proceed with therapeutic delivery.\\n\\nThe field placement and ultrasound imaging, per fraction, is separate and distinct from the initial simulation and is an important task in providing safe administration of superficial radiation therapy. Physician evaluation of the ultrasound information will be ongoing throughout the course of treatment and is deemed medically necessary to ensure the efficacy of treatment, whether to proceed with therapeutic delivery, and determine any necessary changes. Today's images were evaluated for determination of continuation of treatment with the current plan or with necessary changes as appropriate. Additionally, the use of ultrasound visualization and targeted assessment allows the patient to be able to see his or her cancer(s) progress, encouraging the patient to complete and maintain compliance through the full course of prescribed radiotherapy. Per Dr. Ospina, continued ultrasound guidance and therapeutic radiology simulation-aided field setting verification per fraction is required for field placement, measurement of tumor depth, tissues evaluation, progress, acute effect monitoring, and determination for therapeutic treatment delivery is appropriate.
Ultrasound Used Text: High frequency ultrasound depth is 2.06 mm, which is 0.30 mm in difference from previous imaging.

## 2023-08-31 ENCOUNTER — APPOINTMENT (OUTPATIENT)
Dept: URBAN - METROPOLITAN AREA CLINIC 259 | Age: 83
Setting detail: DERMATOLOGY
End: 2023-09-01

## 2023-08-31 PROBLEM — C44.319 BASAL CELL CARCINOMA OF SKIN OF OTHER PARTS OF FACE: Status: ACTIVE | Noted: 2023-08-31

## 2023-08-31 PROBLEM — C44.399 OTHER SPECIFIED MALIGNANT NEOPLASM OF SKIN OF OTHER PARTS OF FACE: Status: ACTIVE | Noted: 2023-08-31

## 2023-08-31 PROBLEM — C44.311 BASAL CELL CARCINOMA OF SKIN OF NOSE: Status: ACTIVE | Noted: 2023-08-31

## 2023-08-31 PROBLEM — C44.01 BASAL CELL CARCINOMA OF SKIN OF LIP: Status: ACTIVE | Noted: 2023-08-31

## 2023-08-31 PROCEDURE — 77280 THER RAD SIMULAJ FIELD SMPL: CPT

## 2023-08-31 PROCEDURE — OTHER IMAGE GUIDED - SUPERFICIAL RADIOTHERAPY: TREATMENT VISIT: OTHER

## 2023-08-31 PROCEDURE — 77401 RADIATION TX DELIVERY SUPFC: CPT

## 2023-08-31 PROCEDURE — OTHER IMAGE GUIDED - SUPERFICIAL RADIOTHERAPY: OTHER

## 2023-08-31 PROCEDURE — G6001 ECHO GUIDANCE RADIOTHERAPY: HCPCS | Mod: XU

## 2023-08-31 NOTE — PROCEDURE: IMAGE GUIDED - SUPERFICIAL RADIOTHERAPY: TREATMENT VISIT
Calculate Total Cumulative Dose Automatically Or Manually: Manually
Show Ultrasound In Note?: Yes
Total Cumulative Dose (Cgy): 807.30
Additional Change To Daily Dosage Administered Mid Treatment?: No
Add X Modifier?: JURADO - Unusual Non-Overlapping Service
Ultrasound Used Text: High frequency ultrasound depth is 2.01 mm, which is 0.01 mm in difference from previous imaging.
Prescription Used: 1
Ultrasound Not Used Text: Ultrasound was not performed today due to
Fraction Number: 3
Treatment Documentation: This patient has been treated today with image-guided superficial radiation therapy for non-melanoma skin cancer. Written informed consent has been previously obtained from this patient for this treatment. This consent is documented in the patient's chart. The patient gave verbal consent to continue treatment today. The patient was treated with a specific radiation dose and setup as prescribed by the provider listed on this visit note.  A Radiation Therapist performed administration of radiation under the supervision of a provider. The treatment parameters and cumulative dose are indicated above. Prior to administering the radiation, the patient underwent a verification therapeutic radiology simulation-aided field setting defining relevant normal and abnormal target anatomy and acquiring images with a separate and distinct diagnostic high-frequency ultrasound to delineate tissues and determine whether to proceed with delivery of therapeutic, in addition to retrieve data necessary to develop an optimal radiation treatment process for the patient. The field placement simulation documents any change seen in overall tumor volume documented in the patient’s record, allows the clinician to indicate any needed changes in the treatment plan and/or prescription, provides diagnostic evaluation as the basis for performing the therapeutic procedure, and clearly identifies the information needed to decide to proceed with the therapeutic procedure. This process includes verification of the treatment port(s) and proper treatment positioning. All treatment ports were photographed within electronic medical records. The patient's lead blocking along with gross tumor volume and margin was confirmed. Considering superficial radiotherapy is clinical in setup, this requires the physician and radiation therapist to clarify the location interest being treated against initial images, ultrasound, pathology, and patient anatomy. Care was taken to ensure laboy treated were geometrically accurate and properly positioned using therapeutic radiology simulation-aided field setting verification per fraction. This process is also utilized to determine if any prescription or setup changes are necessary. These steps are therefore medically necessary to ensure safe and effective administration of radiation. Ongoing therapeutic radiology simulation-aided field setting verification is ordered throughout the course of therapy.  \\n\\nA high-frequency ultrasound image was acquired today for a two-dimensional evaluation of the tumor volume, depth, width, breadth, review of prior response to treatment, provide geometric accuracy of field placement, and determine whether to proceed with therapeutic delivery.\\n\\nThe field placement and ultrasound imaging, per fraction, is separate and distinct from the initial simulation and is an important task in providing safe administration of superficial radiation therapy. Physician evaluation of the ultrasound information will be ongoing throughout the course of treatment and is deemed medically necessary to ensure the efficacy of treatment, whether to proceed with therapeutic delivery, and determine any necessary changes. Today's images were evaluated for determination of continuation of treatment with the current plan or with necessary changes as appropriate. Additionally, the use of ultrasound visualization and targeted assessment allows the patient to be able to see his or her cancer(s) progress, encouraging the patient to complete and maintain compliance through the full course of prescribed radiotherapy. Per Dr. Ospina, continued ultrasound guidance and therapeutic radiology simulation-aided field setting verification per fraction is required for field placement, measurement of tumor depth, tissues evaluation, progress, acute effect monitoring, and determination for therapeutic treatment delivery is appropriate.
Energy (Kv): 100
Daily Dosage (Cgy): 269.10
Additional Comments (Add Customization Of Note Here): Yefri is doing well today with no questions or concerns regarding his treatments.
Treatment Documentation: This patient has been treated today with image-guided superficial radiation therapy for non-melanoma skin cancer. Written informed consent has been previously obtained from this patient for this treatment. This consent is documented in the patient's chart. The patient gave verbal consent to continue treatment today. The patient was treated with a specific radiation dose and setup as prescribed by the provider listed on this visit note.  A Radiation Therapist performed administration of radiation under the supervision of a provider. The treatment parameters and cumulative dose are indicated above. Prior to administering the radiation, the patient underwent a verification therapeutic radiology simulation-aided field setting defining relevant normal and abnormal target anatomy and acquiring images with a separate and distinct diagnostic high-frequency ultrasound to delineate tissues and determine whether to proceed with delivery of therapeutic, in addition to retrieve data necessary to develop an optimal radiation treatment process for the patient. The field placement simulation documents any change seen in overall tumor volume documented in the patient’s record, allows the clinician to indicate any needed changes in the treatment plan and/or prescription, provides diagnostic evaluation as the basis for performing the therapeutic procedure, and clearly identifies the information needed to decide to proceed with the therapeutic procedure. This process includes verification of the treatment port(s) and proper treatment positioning. All treatment ports were photographed within electronic medical records. The patient's lead blocking along with gross tumor volume and margin was confirmed. Considering superficial radiotherapy is clinical in setup, this requires the physician and radiation therapist to clarify the location interest being treated against initial images, ultrasound, pathology, and patient anatomy. Care was taken to ensure laboy treated were geometrically accurate and properly positioned using therapeutic radiology simulation-aided field setting verification per fraction. This process is also utilized to determine if any prescription or setup changes are necessary. These steps are therefore medically necessary to ensure safe and effective administration of radiation. Ongoing therapeutic radiology simulation-aided field setting verification is ordered throughout the course of therapy.  \\n\\nA high-frequency ultrasound image was acquired today for a two-dimensional evaluation of the tumor volume, depth, width, breadth, review of prior response to treatment, provide geometric accuracy of field placement, and determine whether to proceed with therapeutic delivery.\\n\\nThe field placement and ultrasound imaging, per fraction, is separate and distinct from the initial simulation and is an important task in providing safe administration of superficial radiation therapy. Physician evaluation of the ultrasound information will be ongoing throughout the course of treatment and is deemed medically necessary to ensure the efficacy of treatment, whether to proceed with therapeutic delivery, and determine any necessary changes. Today's images were evaluated for determination of continuation of treatment with the current plan or with necessary changes as appropriate. Additionally, the use of ultrasound visualization and targeted assessment allows the patient to be able to see his or her cancer(s) progress, encouraging the patient to complete and maintain compliance through the full course of prescribed radiotherapy. Per Dr. Ospina, continued ultrasound guidance and therapeutic radiology simulation-aided field setting verification per fraction is required for field placement, measurement of tumor depth, tissues evaluation, progress, acute effect monitoring, and determination for therapeutic treatment delivery is appropriate.
Additional Comments (Add Customization Of Note Here): Yefri is doing well today with no questions or concerns regarding his treatments.
Daily Dosage (Cgy): 267.46
Total Cumulative Dose (Cgy): 802.38
Ultrasound Used Text: High frequency ultrasound depth is 1.08 mm, which is 0.22 mm in difference from previous imaging.
Additional Comments (Add Customization Of Note Here): Yefri is doing well today with no questions or concerns regarding his treatments.
Daily Dosage (Cgy): 264.04
Energy (Kv): 70
Total Cumulative Dose (Cgy): 792.12
Ultrasound Used Text: High frequency ultrasound depth is 0.85 mm, which is 0.05 mm in difference from previous imaging.
Treatment Documentation: This patient has been treated today with image-guided superficial radiation therapy for non-melanoma skin cancer. Written informed consent has been previously obtained from this patient for this treatment. This consent is documented in the patient's chart. The patient gave verbal consent to continue treatment today. The patient was treated with a specific radiation dose and setup as prescribed by the provider listed on this visit note.  A Radiation Therapist performed administration of radiation under the supervision of a provider. The treatment parameters and cumulative dose are indicated above. Prior to administering the radiation, the patient underwent a verification therapeutic radiology simulation-aided field setting defining relevant normal and abnormal target anatomy and acquiring images with a separate and distinct diagnostic high-frequency ultrasound to delineate tissues and determine whether to proceed with delivery of therapeutic, in addition to retrieve data necessary to develop an optimal radiation treatment process for the patient. The field placement simulation documents any change seen in overall tumor volume documented in the patient’s record, allows the clinician to indicate any needed changes in the treatment plan and/or prescription, provides diagnostic evaluation as the basis for performing the therapeutic procedure, and clearly identifies the information needed to decide to proceed with the therapeutic procedure. This process includes verification of the treatment port(s) and proper treatment positioning. All treatment ports were photographed within electronic medical records. The patient's lead blocking along with gross tumor volume and margin was confirmed. Considering superficial radiotherapy is clinical in setup, this requires the physician and radiation therapist to clarify the location interest being treated against initial images, ultrasound, pathology, and patient anatomy. Care was taken to ensure laboy treated were geometrically accurate and properly positioned using therapeutic radiology simulation-aided field setting verification per fraction. This process is also utilized to determine if any prescription or setup changes are necessary. These steps are therefore medically necessary to ensure safe and effective administration of radiation. Ongoing therapeutic radiology simulation-aided field setting verification is ordered throughout the course of therapy.  \\n\\nA high-frequency ultrasound image was acquired today for a two-dimensional evaluation of the tumor volume, depth, width, breadth, review of prior response to treatment, provide geometric accuracy of field placement, and determine whether to proceed with therapeutic delivery.\\n\\nThe field placement and ultrasound imaging, per fraction, is separate and distinct from the initial simulation and is an important task in providing safe administration of superficial radiation therapy. Physician evaluation of the ultrasound information will be ongoing throughout the course of treatment and is deemed medically necessary to ensure the efficacy of treatment, whether to proceed with therapeutic delivery, and determine any necessary changes. Today's images were evaluated for determination of continuation of treatment with the current plan or with necessary changes as appropriate. Additionally, the use of ultrasound visualization and targeted assessment allows the patient to be able to see his or her cancer(s) progress, encouraging the patient to complete and maintain compliance through the full course of prescribed radiotherapy. Per Dr. Ospina, continued ultrasound guidance and therapeutic radiology simulation-aided field setting verification per fraction is required for field placement, measurement of tumor depth, tissues evaluation, progress, acute effect monitoring, and determination for therapeutic treatment delivery is appropriate.
Treatment Documentation: This patient has been treated today with image-guided superficial radiation therapy for non-melanoma skin cancer. Written informed consent has been previously obtained from this patient for this treatment. This consent is documented in the patient's chart. The patient gave verbal consent to continue treatment today. The patient was treated with a specific radiation dose and setup as prescribed by the provider listed on this visit note.  A Radiation Therapist performed administration of radiation under the supervision of a provider. The treatment parameters and cumulative dose are indicated above. Prior to administering the radiation, the patient underwent a verification therapeutic radiology simulation-aided field setting defining relevant normal and abnormal target anatomy and acquiring images with a separate and distinct diagnostic high-frequency ultrasound to delineate tissues and determine whether to proceed with delivery of therapeutic, in addition to retrieve data necessary to develop an optimal radiation treatment process for the patient. The field placement simulation documents any change seen in overall tumor volume documented in the patient’s record, allows the clinician to indicate any needed changes in the treatment plan and/or prescription, provides diagnostic evaluation as the basis for performing the therapeutic procedure, and clearly identifies the information needed to decide to proceed with the therapeutic procedure. This process includes verification of the treatment port(s) and proper treatment positioning. All treatment ports were photographed within electronic medical records. The patient's lead blocking along with gross tumor volume and margin was confirmed. Considering superficial radiotherapy is clinical in setup, this requires the physician and radiation therapist to clarify the location interest being treated against initial images, ultrasound, pathology, and patient anatomy. Care was taken to ensure laboy treated were geometrically accurate and properly positioned using therapeutic radiology simulation-aided field setting verification per fraction. This process is also utilized to determine if any prescription or setup changes are necessary. These steps are therefore medically necessary to ensure safe and effective administration of radiation. Ongoing therapeutic radiology simulation-aided field setting verification is ordered throughout the course of therapy.  \\n\\nA high-frequency ultrasound image was acquired today for a two-dimensional evaluation of the tumor volume, depth, width, breadth, review of prior response to treatment, provide geometric accuracy of field placement, and determine whether to proceed with therapeutic delivery.\\n\\nThe field placement and ultrasound imaging, per fraction, is separate and distinct from the initial simulation and is an important task in providing safe administration of superficial radiation therapy. Physician evaluation of the ultrasound information will be ongoing throughout the course of treatment and is deemed medically necessary to ensure the efficacy of treatment, whether to proceed with therapeutic delivery, and determine any necessary changes. Today's images were evaluated for determination of continuation of treatment with the current plan or with necessary changes as appropriate. Additionally, the use of ultrasound visualization and targeted assessment allows the patient to be able to see his or her cancer(s) progress, encouraging the patient to complete and maintain compliance through the full course of prescribed radiotherapy. Per Dr. Ospina, continued ultrasound guidance and therapeutic radiology simulation-aided field setting verification per fraction is required for field placement, measurement of tumor depth, tissues evaluation, progress, acute effect monitoring, and determination for therapeutic treatment delivery is appropriate.
Additional Comments (Add Customization Of Note Here): Yefri is doing well today with no questions or concerns regarding his treatments.
Ultrasound Used Text: High frequency ultrasound depth is 1.99 mm, which is 0.07 mm in difference from previous imaging.

## 2023-09-05 ENCOUNTER — APPOINTMENT (OUTPATIENT)
Dept: URBAN - METROPOLITAN AREA CLINIC 259 | Age: 83
Setting detail: DERMATOLOGY
End: 2023-09-06

## 2023-09-05 PROBLEM — C44.01 BASAL CELL CARCINOMA OF SKIN OF LIP: Status: ACTIVE | Noted: 2023-09-05

## 2023-09-05 PROBLEM — C44.399 OTHER SPECIFIED MALIGNANT NEOPLASM OF SKIN OF OTHER PARTS OF FACE: Status: ACTIVE | Noted: 2023-09-05

## 2023-09-05 PROBLEM — C44.311 BASAL CELL CARCINOMA OF SKIN OF NOSE: Status: ACTIVE | Noted: 2023-09-05

## 2023-09-05 PROBLEM — C44.319 BASAL CELL CARCINOMA OF SKIN OF OTHER PARTS OF FACE: Status: ACTIVE | Noted: 2023-09-05

## 2023-09-05 PROCEDURE — G6001 ECHO GUIDANCE RADIOTHERAPY: HCPCS | Mod: XU

## 2023-09-05 PROCEDURE — OTHER IMAGE GUIDED - SUPERFICIAL RADIOTHERAPY: TREATMENT VISIT: OTHER

## 2023-09-05 PROCEDURE — 77401 RADIATION TX DELIVERY SUPFC: CPT

## 2023-09-05 PROCEDURE — 77280 THER RAD SIMULAJ FIELD SMPL: CPT

## 2023-09-05 PROCEDURE — OTHER IMAGE GUIDED - SUPERFICIAL RADIOTHERAPY: OTHER

## 2023-09-05 NOTE — PROCEDURE: IMAGE GUIDED - SUPERFICIAL RADIOTHERAPY: TREATMENT VISIT
Calculate Total Cumulative Dose Automatically Or Manually: Manually
Show Ultrasound In Note?: Yes
Total Cumulative Dose (Cgy): 1076.40
Additional Change To Daily Dosage Administered Mid Treatment?: No
Add X Modifier?: JURADO - Unusual Non-Overlapping Service
Ultrasound Used Text: High frequency ultrasound depth is 1.39 mm, which is 0.62 mm in difference from previous imaging.
Prescription Used: 1
Ultrasound Not Used Text: Ultrasound was not performed today due to
Fraction Number: 4
Treatment Documentation: This patient has been treated today with image-guided superficial radiation therapy for non-melanoma skin cancer. Written informed consent has been previously obtained from this patient for this treatment. This consent is documented in the patient's chart. The patient gave verbal consent to continue treatment today. The patient was treated with a specific radiation dose and setup as prescribed by the provider listed on this visit note.  A Radiation Therapist performed administration of radiation under the supervision of a provider. The treatment parameters and cumulative dose are indicated above. Prior to administering the radiation, the patient underwent a verification therapeutic radiology simulation-aided field setting defining relevant normal and abnormal target anatomy and acquiring images with a separate and distinct diagnostic high-frequency ultrasound to delineate tissues and determine whether to proceed with delivery of therapeutic, in addition to retrieve data necessary to develop an optimal radiation treatment process for the patient. The field placement simulation documents any change seen in overall tumor volume documented in the patient’s record, allows the clinician to indicate any needed changes in the treatment plan and/or prescription, provides diagnostic evaluation as the basis for performing the therapeutic procedure, and clearly identifies the information needed to decide to proceed with the therapeutic procedure. This process includes verification of the treatment port(s) and proper treatment positioning. All treatment ports were photographed within electronic medical records. The patient's lead blocking along with gross tumor volume and margin was confirmed. Considering superficial radiotherapy is clinical in setup, this requires the physician and radiation therapist to clarify the location interest being treated against initial images, ultrasound, pathology, and patient anatomy. Care was taken to ensure laboy treated were geometrically accurate and properly positioned using therapeutic radiology simulation-aided field setting verification per fraction. This process is also utilized to determine if any prescription or setup changes are necessary. These steps are therefore medically necessary to ensure safe and effective administration of radiation. Ongoing therapeutic radiology simulation-aided field setting verification is ordered throughout the course of therapy.  \\n\\nA high-frequency ultrasound image was acquired today for a two-dimensional evaluation of the tumor volume, depth, width, breadth, review of prior response to treatment, provide geometric accuracy of field placement, and determine whether to proceed with therapeutic delivery.\\n\\nThe field placement and ultrasound imaging, per fraction, is separate and distinct from the initial simulation and is an important task in providing safe administration of superficial radiation therapy. Physician evaluation of the ultrasound information will be ongoing throughout the course of treatment and is deemed medically necessary to ensure the efficacy of treatment, whether to proceed with therapeutic delivery, and determine any necessary changes. Today's images were evaluated for determination of continuation of treatment with the current plan or with necessary changes as appropriate. Additionally, the use of ultrasound visualization and targeted assessment allows the patient to be able to see his or her cancer(s) progress, encouraging the patient to complete and maintain compliance through the full course of prescribed radiotherapy. Per Dr. Ospina, continued ultrasound guidance and therapeutic radiology simulation-aided field setting verification per fraction is required for field placement, measurement of tumor depth, tissues evaluation, progress, acute effect monitoring, and determination for therapeutic treatment delivery is appropriate.
Energy (Kv): 100
Daily Dosage (Cgy): 269.10
Additional Comments (Add Customization Of Note Here): Yefri is doing well today with no questions or concerns regarding his treatments.
Treatment Documentation: This patient has been treated today with image-guided superficial radiation therapy for non-melanoma skin cancer. Written informed consent has been previously obtained from this patient for this treatment. This consent is documented in the patient's chart. The patient gave verbal consent to continue treatment today. The patient was treated with a specific radiation dose and setup as prescribed by the provider listed on this visit note.  A Radiation Therapist performed administration of radiation under the supervision of a provider. The treatment parameters and cumulative dose are indicated above. Prior to administering the radiation, the patient underwent a verification therapeutic radiology simulation-aided field setting defining relevant normal and abnormal target anatomy and acquiring images with a separate and distinct diagnostic high-frequency ultrasound to delineate tissues and determine whether to proceed with delivery of therapeutic, in addition to retrieve data necessary to develop an optimal radiation treatment process for the patient. The field placement simulation documents any change seen in overall tumor volume documented in the patient’s record, allows the clinician to indicate any needed changes in the treatment plan and/or prescription, provides diagnostic evaluation as the basis for performing the therapeutic procedure, and clearly identifies the information needed to decide to proceed with the therapeutic procedure. This process includes verification of the treatment port(s) and proper treatment positioning. All treatment ports were photographed within electronic medical records. The patient's lead blocking along with gross tumor volume and margin was confirmed. Considering superficial radiotherapy is clinical in setup, this requires the physician and radiation therapist to clarify the location interest being treated against initial images, ultrasound, pathology, and patient anatomy. Care was taken to ensure laboy treated were geometrically accurate and properly positioned using therapeutic radiology simulation-aided field setting verification per fraction. This process is also utilized to determine if any prescription or setup changes are necessary. These steps are therefore medically necessary to ensure safe and effective administration of radiation. Ongoing therapeutic radiology simulation-aided field setting verification is ordered throughout the course of therapy.  \\n\\nA high-frequency ultrasound image was acquired today for a two-dimensional evaluation of the tumor volume, depth, width, breadth, review of prior response to treatment, provide geometric accuracy of field placement, and determine whether to proceed with therapeutic delivery.\\n\\nThe field placement and ultrasound imaging, per fraction, is separate and distinct from the initial simulation and is an important task in providing safe administration of superficial radiation therapy. Physician evaluation of the ultrasound information will be ongoing throughout the course of treatment and is deemed medically necessary to ensure the efficacy of treatment, whether to proceed with therapeutic delivery, and determine any necessary changes. Today's images were evaluated for determination of continuation of treatment with the current plan or with necessary changes as appropriate. Additionally, the use of ultrasound visualization and targeted assessment allows the patient to be able to see his or her cancer(s) progress, encouraging the patient to complete and maintain compliance through the full course of prescribed radiotherapy. Per Dr. Ospina, continued ultrasound guidance and therapeutic radiology simulation-aided field setting verification per fraction is required for field placement, measurement of tumor depth, tissues evaluation, progress, acute effect monitoring, and determination for therapeutic treatment delivery is appropriate.
Additional Comments (Add Customization Of Note Here): Yefri is doing well today with no questions or concerns regarding his treatments.
Daily Dosage (Cgy): 267.46
Total Cumulative Dose (Cgy): 1069.84
Ultrasound Used Text: High frequency ultrasound depth is 0.97 mm, which is 0.11 mm in difference from previous imaging.
Additional Comments (Add Customization Of Note Here): Yefri is doing well today with no questions or concerns regarding his treatments.
Daily Dosage (Cgy): 264.04
Energy (Kv): 70
Total Cumulative Dose (Cgy): 1056.16
Ultrasound Used Text: High frequency ultrasound depth is 1.09 mm, which is 0.24 mm in difference from previous imaging.
Treatment Documentation: This patient has been treated today with image-guided superficial radiation therapy for non-melanoma skin cancer. Written informed consent has been previously obtained from this patient for this treatment. This consent is documented in the patient's chart. The patient gave verbal consent to continue treatment today. The patient was treated with a specific radiation dose and setup as prescribed by the provider listed on this visit note.  A Radiation Therapist performed administration of radiation under the supervision of a provider. The treatment parameters and cumulative dose are indicated above. Prior to administering the radiation, the patient underwent a verification therapeutic radiology simulation-aided field setting defining relevant normal and abnormal target anatomy and acquiring images with a separate and distinct diagnostic high-frequency ultrasound to delineate tissues and determine whether to proceed with delivery of therapeutic, in addition to retrieve data necessary to develop an optimal radiation treatment process for the patient. The field placement simulation documents any change seen in overall tumor volume documented in the patient’s record, allows the clinician to indicate any needed changes in the treatment plan and/or prescription, provides diagnostic evaluation as the basis for performing the therapeutic procedure, and clearly identifies the information needed to decide to proceed with the therapeutic procedure. This process includes verification of the treatment port(s) and proper treatment positioning. All treatment ports were photographed within electronic medical records. The patient's lead blocking along with gross tumor volume and margin was confirmed. Considering superficial radiotherapy is clinical in setup, this requires the physician and radiation therapist to clarify the location interest being treated against initial images, ultrasound, pathology, and patient anatomy. Care was taken to ensure laboy treated were geometrically accurate and properly positioned using therapeutic radiology simulation-aided field setting verification per fraction. This process is also utilized to determine if any prescription or setup changes are necessary. These steps are therefore medically necessary to ensure safe and effective administration of radiation. Ongoing therapeutic radiology simulation-aided field setting verification is ordered throughout the course of therapy.  \\n\\nA high-frequency ultrasound image was acquired today for a two-dimensional evaluation of the tumor volume, depth, width, breadth, review of prior response to treatment, provide geometric accuracy of field placement, and determine whether to proceed with therapeutic delivery.\\n\\nThe field placement and ultrasound imaging, per fraction, is separate and distinct from the initial simulation and is an important task in providing safe administration of superficial radiation therapy. Physician evaluation of the ultrasound information will be ongoing throughout the course of treatment and is deemed medically necessary to ensure the efficacy of treatment, whether to proceed with therapeutic delivery, and determine any necessary changes. Today's images were evaluated for determination of continuation of treatment with the current plan or with necessary changes as appropriate. Additionally, the use of ultrasound visualization and targeted assessment allows the patient to be able to see his or her cancer(s) progress, encouraging the patient to complete and maintain compliance through the full course of prescribed radiotherapy. Per Dr. Ospina, continued ultrasound guidance and therapeutic radiology simulation-aided field setting verification per fraction is required for field placement, measurement of tumor depth, tissues evaluation, progress, acute effect monitoring, and determination for therapeutic treatment delivery is appropriate.
Treatment Documentation: This patient has been treated today with image-guided superficial radiation therapy for non-melanoma skin cancer. Written informed consent has been previously obtained from this patient for this treatment. This consent is documented in the patient's chart. The patient gave verbal consent to continue treatment today. The patient was treated with a specific radiation dose and setup as prescribed by the provider listed on this visit note.  A Radiation Therapist performed administration of radiation under the supervision of a provider. The treatment parameters and cumulative dose are indicated above. Prior to administering the radiation, the patient underwent a verification therapeutic radiology simulation-aided field setting defining relevant normal and abnormal target anatomy and acquiring images with a separate and distinct diagnostic high-frequency ultrasound to delineate tissues and determine whether to proceed with delivery of therapeutic, in addition to retrieve data necessary to develop an optimal radiation treatment process for the patient. The field placement simulation documents any change seen in overall tumor volume documented in the patient’s record, allows the clinician to indicate any needed changes in the treatment plan and/or prescription, provides diagnostic evaluation as the basis for performing the therapeutic procedure, and clearly identifies the information needed to decide to proceed with the therapeutic procedure. This process includes verification of the treatment port(s) and proper treatment positioning. All treatment ports were photographed within electronic medical records. The patient's lead blocking along with gross tumor volume and margin was confirmed. Considering superficial radiotherapy is clinical in setup, this requires the physician and radiation therapist to clarify the location interest being treated against initial images, ultrasound, pathology, and patient anatomy. Care was taken to ensure laboy treated were geometrically accurate and properly positioned using therapeutic radiology simulation-aided field setting verification per fraction. This process is also utilized to determine if any prescription or setup changes are necessary. These steps are therefore medically necessary to ensure safe and effective administration of radiation. Ongoing therapeutic radiology simulation-aided field setting verification is ordered throughout the course of therapy.  \\n\\nA high-frequency ultrasound image was acquired today for a two-dimensional evaluation of the tumor volume, depth, width, breadth, review of prior response to treatment, provide geometric accuracy of field placement, and determine whether to proceed with therapeutic delivery.\\n\\nThe field placement and ultrasound imaging, per fraction, is separate and distinct from the initial simulation and is an important task in providing safe administration of superficial radiation therapy. Physician evaluation of the ultrasound information will be ongoing throughout the course of treatment and is deemed medically necessary to ensure the efficacy of treatment, whether to proceed with therapeutic delivery, and determine any necessary changes. Today's images were evaluated for determination of continuation of treatment with the current plan or with necessary changes as appropriate. Additionally, the use of ultrasound visualization and targeted assessment allows the patient to be able to see his or her cancer(s) progress, encouraging the patient to complete and maintain compliance through the full course of prescribed radiotherapy. Per Dr. Ospina, continued ultrasound guidance and therapeutic radiology simulation-aided field setting verification per fraction is required for field placement, measurement of tumor depth, tissues evaluation, progress, acute effect monitoring, and determination for therapeutic treatment delivery is appropriate.
Additional Comments (Add Customization Of Note Here): Yefri is doing well today with no questions or concerns regarding his treatments.
Ultrasound Used Text: High frequency ultrasound depth is 1.47 mm, which is 0.52 mm in difference from previous imaging.

## 2023-09-06 ENCOUNTER — APPOINTMENT (OUTPATIENT)
Dept: URBAN - METROPOLITAN AREA CLINIC 259 | Age: 83
Setting detail: DERMATOLOGY
End: 2023-09-06

## 2023-09-06 PROBLEM — C44.319 BASAL CELL CARCINOMA OF SKIN OF OTHER PARTS OF FACE: Status: ACTIVE | Noted: 2023-09-06

## 2023-09-06 PROBLEM — C44.311 BASAL CELL CARCINOMA OF SKIN OF NOSE: Status: ACTIVE | Noted: 2023-09-06

## 2023-09-06 PROBLEM — C44.399 OTHER SPECIFIED MALIGNANT NEOPLASM OF SKIN OF OTHER PARTS OF FACE: Status: ACTIVE | Noted: 2023-09-06

## 2023-09-06 PROBLEM — C44.01 BASAL CELL CARCINOMA OF SKIN OF LIP: Status: ACTIVE | Noted: 2023-09-06

## 2023-09-06 PROCEDURE — 77401 RADIATION TX DELIVERY SUPFC: CPT

## 2023-09-06 PROCEDURE — G6001 ECHO GUIDANCE RADIOTHERAPY: HCPCS | Mod: XU

## 2023-09-06 PROCEDURE — 77280 THER RAD SIMULAJ FIELD SMPL: CPT

## 2023-09-06 PROCEDURE — OTHER IMAGE GUIDED - SUPERFICIAL RADIOTHERAPY: OTHER

## 2023-09-06 PROCEDURE — OTHER IMAGE GUIDED - SUPERFICIAL RADIOTHERAPY: EVALUATION VISIT: OTHER

## 2023-09-06 PROCEDURE — OTHER IMAGE GUIDED - SUPERFICIAL RADIOTHERAPY: TREATMENT VISIT: OTHER

## 2023-09-06 PROCEDURE — 77427 RADIATION TX MANAGEMENT X5: CPT

## 2023-09-06 NOTE — PROCEDURE: IMAGE GUIDED - SUPERFICIAL RADIOTHERAPY: TREATMENT VISIT
Calculate Total Cumulative Dose Automatically Or Manually: Manually
Show Ultrasound In Note?: Yes
Total Cumulative Dose (Cgy): 1345.50
Additional Change To Daily Dosage Administered Mid Treatment?: No
Add X Modifier?: JURADO - Unusual Non-Overlapping Service
Ultrasound Used Text: High frequency ultrasound depth is 1.41 mm, which is 0.02 mm in difference from previous imaging.
Prescription Used: 1
Ultrasound Not Used Text: Ultrasound was not performed today due to
Fraction Number: 5
Treatment Documentation: This patient has been treated today with image-guided superficial radiation therapy for non-melanoma skin cancer. Written informed consent has been previously obtained from this patient for this treatment. This consent is documented in the patient's chart. The patient gave verbal consent to continue treatment today. The patient was treated with a specific radiation dose and setup as prescribed by the provider listed on this visit note.  A Radiation Therapist performed administration of radiation under the supervision of a provider. The treatment parameters and cumulative dose are indicated above. Prior to administering the radiation, the patient underwent a verification therapeutic radiology simulation-aided field setting defining relevant normal and abnormal target anatomy and acquiring images with a separate and distinct diagnostic high-frequency ultrasound to delineate tissues and determine whether to proceed with delivery of therapeutic, in addition to retrieve data necessary to develop an optimal radiation treatment process for the patient. The field placement simulation documents any change seen in overall tumor volume documented in the patient’s record, allows the clinician to indicate any needed changes in the treatment plan and/or prescription, provides diagnostic evaluation as the basis for performing the therapeutic procedure, and clearly identifies the information needed to decide to proceed with the therapeutic procedure. This process includes verification of the treatment port(s) and proper treatment positioning. All treatment ports were photographed within electronic medical records. The patient's lead blocking along with gross tumor volume and margin was confirmed. Considering superficial radiotherapy is clinical in setup, this requires the physician and radiation therapist to clarify the location interest being treated against initial images, ultrasound, pathology, and patient anatomy. Care was taken to ensure laboy treated were geometrically accurate and properly positioned using therapeutic radiology simulation-aided field setting verification per fraction. This process is also utilized to determine if any prescription or setup changes are necessary. These steps are therefore medically necessary to ensure safe and effective administration of radiation. Ongoing therapeutic radiology simulation-aided field setting verification is ordered throughout the course of therapy.  \\n\\nA high-frequency ultrasound image was acquired today for a two-dimensional evaluation of the tumor volume, depth, width, breadth, review of prior response to treatment, provide geometric accuracy of field placement, and determine whether to proceed with therapeutic delivery.\\n\\nThe field placement and ultrasound imaging, per fraction, is separate and distinct from the initial simulation and is an important task in providing safe administration of superficial radiation therapy. Physician evaluation of the ultrasound information will be ongoing throughout the course of treatment and is deemed medically necessary to ensure the efficacy of treatment, whether to proceed with therapeutic delivery, and determine any necessary changes. Today's images were evaluated for determination of continuation of treatment with the current plan or with necessary changes as appropriate. Additionally, the use of ultrasound visualization and targeted assessment allows the patient to be able to see his or her cancer(s) progress, encouraging the patient to complete and maintain compliance through the full course of prescribed radiotherapy. Per Dr. Ospina, continued ultrasound guidance and therapeutic radiology simulation-aided field setting verification per fraction is required for field placement, measurement of tumor depth, tissues evaluation, progress, acute effect monitoring, and determination for therapeutic treatment delivery is appropriate.
Energy (Kv): 100
Daily Dosage (Cgy): 269.10
Treatment Documentation: This patient has been treated today with image-guided superficial radiation therapy for non-melanoma skin cancer. Written informed consent has been previously obtained from this patient for this treatment. This consent is documented in the patient's chart. The patient gave verbal consent to continue treatment today. The patient was treated with a specific radiation dose and setup as prescribed by the provider listed on this visit note.  A Radiation Therapist performed administration of radiation under the supervision of a provider. The treatment parameters and cumulative dose are indicated above. Prior to administering the radiation, the patient underwent a verification therapeutic radiology simulation-aided field setting defining relevant normal and abnormal target anatomy and acquiring images with a separate and distinct diagnostic high-frequency ultrasound to delineate tissues and determine whether to proceed with delivery of therapeutic, in addition to retrieve data necessary to develop an optimal radiation treatment process for the patient. The field placement simulation documents any change seen in overall tumor volume documented in the patient’s record, allows the clinician to indicate any needed changes in the treatment plan and/or prescription, provides diagnostic evaluation as the basis for performing the therapeutic procedure, and clearly identifies the information needed to decide to proceed with the therapeutic procedure. This process includes verification of the treatment port(s) and proper treatment positioning. All treatment ports were photographed within electronic medical records. The patient's lead blocking along with gross tumor volume and margin was confirmed. Considering superficial radiotherapy is clinical in setup, this requires the physician and radiation therapist to clarify the location interest being treated against initial images, ultrasound, pathology, and patient anatomy. Care was taken to ensure laboy treated were geometrically accurate and properly positioned using therapeutic radiology simulation-aided field setting verification per fraction. This process is also utilized to determine if any prescription or setup changes are necessary. These steps are therefore medically necessary to ensure safe and effective administration of radiation. Ongoing therapeutic radiology simulation-aided field setting verification is ordered throughout the course of therapy.  \\n\\nA high-frequency ultrasound image was acquired today for a two-dimensional evaluation of the tumor volume, depth, width, breadth, review of prior response to treatment, provide geometric accuracy of field placement, and determine whether to proceed with therapeutic delivery.\\n\\nThe field placement and ultrasound imaging, per fraction, is separate and distinct from the initial simulation and is an important task in providing safe administration of superficial radiation therapy. Physician evaluation of the ultrasound information will be ongoing throughout the course of treatment and is deemed medically necessary to ensure the efficacy of treatment, whether to proceed with therapeutic delivery, and determine any necessary changes. Today's images were evaluated for determination of continuation of treatment with the current plan or with necessary changes as appropriate. Additionally, the use of ultrasound visualization and targeted assessment allows the patient to be able to see his or her cancer(s) progress, encouraging the patient to complete and maintain compliance through the full course of prescribed radiotherapy. Per Dr. Ospina, continued ultrasound guidance and therapeutic radiology simulation-aided field setting verification per fraction is required for field placement, measurement of tumor depth, tissues evaluation, progress, acute effect monitoring, and determination for therapeutic treatment delivery is appropriate.
Daily Dosage (Cgy): 267.46
Total Cumulative Dose (Cgy): 1337.30
Ultrasound Used Text: High frequency ultrasound depth is 1.06 mm, which is 0.09 mm in difference from previous imaging.
Daily Dosage (Cgy): 264.04
Energy (Kv): 70
Total Cumulative Dose (Cgy): 1320.20
Ultrasound Used Text: High frequency ultrasound depth is 1.18 mm, which is 0.09 mm in difference from previous imaging.
Treatment Documentation: This patient has been treated today with image-guided superficial radiation therapy for non-melanoma skin cancer. Written informed consent has been previously obtained from this patient for this treatment. This consent is documented in the patient's chart. The patient gave verbal consent to continue treatment today. The patient was treated with a specific radiation dose and setup as prescribed by the provider listed on this visit note.  A Radiation Therapist performed administration of radiation under the supervision of a provider. The treatment parameters and cumulative dose are indicated above. Prior to administering the radiation, the patient underwent a verification therapeutic radiology simulation-aided field setting defining relevant normal and abnormal target anatomy and acquiring images with a separate and distinct diagnostic high-frequency ultrasound to delineate tissues and determine whether to proceed with delivery of therapeutic, in addition to retrieve data necessary to develop an optimal radiation treatment process for the patient. The field placement simulation documents any change seen in overall tumor volume documented in the patient’s record, allows the clinician to indicate any needed changes in the treatment plan and/or prescription, provides diagnostic evaluation as the basis for performing the therapeutic procedure, and clearly identifies the information needed to decide to proceed with the therapeutic procedure. This process includes verification of the treatment port(s) and proper treatment positioning. All treatment ports were photographed within electronic medical records. The patient's lead blocking along with gross tumor volume and margin was confirmed. Considering superficial radiotherapy is clinical in setup, this requires the physician and radiation therapist to clarify the location interest being treated against initial images, ultrasound, pathology, and patient anatomy. Care was taken to ensure laboy treated were geometrically accurate and properly positioned using therapeutic radiology simulation-aided field setting verification per fraction. This process is also utilized to determine if any prescription or setup changes are necessary. These steps are therefore medically necessary to ensure safe and effective administration of radiation. Ongoing therapeutic radiology simulation-aided field setting verification is ordered throughout the course of therapy.  \\n\\nA high-frequency ultrasound image was acquired today for a two-dimensional evaluation of the tumor volume, depth, width, breadth, review of prior response to treatment, provide geometric accuracy of field placement, and determine whether to proceed with therapeutic delivery.\\n\\nThe field placement and ultrasound imaging, per fraction, is separate and distinct from the initial simulation and is an important task in providing safe administration of superficial radiation therapy. Physician evaluation of the ultrasound information will be ongoing throughout the course of treatment and is deemed medically necessary to ensure the efficacy of treatment, whether to proceed with therapeutic delivery, and determine any necessary changes. Today's images were evaluated for determination of continuation of treatment with the current plan or with necessary changes as appropriate. Additionally, the use of ultrasound visualization and targeted assessment allows the patient to be able to see his or her cancer(s) progress, encouraging the patient to complete and maintain compliance through the full course of prescribed radiotherapy. Per Dr. Ospina, continued ultrasound guidance and therapeutic radiology simulation-aided field setting verification per fraction is required for field placement, measurement of tumor depth, tissues evaluation, progress, acute effect monitoring, and determination for therapeutic treatment delivery is appropriate.
Ultrasound Used Text: High frequency ultrasound depth is .60 mm, which is 0.13 mm in difference from previous imaging.

## 2023-09-06 NOTE — PROCEDURE: IMAGE GUIDED - SUPERFICIAL RADIOTHERAPY: EVALUATION VISIT
Was Ultrasound Performed Today?: No
Ultrasound Used Text: Ultrasound depth is mm.
Assessment: Appropriate reaction
Evaluation Plan: The patient is undergoing superficial radiation therapy for skin cancer and presents for weekly evaluation and management. Per protocol and as documented on the flow sheet, the patient was questioned as to subjective redness, pruritus, pain, drainage, fatigue, or any other symptoms. Objectively, the radiation area was evaluated with regards to erythema, atrophy, scale, crusting, erosion, ulceration, edema, purpura, tenderness, warmth, drainage, and any other findings. The plan was extensively reviewed including dose and dosing schedule. The simulation and clinical setup were also reviewed as were external and any internal shields and based on this review the appropriateness and sufficiency of treatment was determined.
Is This Visit For Evaluation During Treatment Or Follow Up Post Treatment?: evaluation
Radiation Therapy Oncology Group (Rtog) Score: 0
Ultrasound Not Used Text: Ultrasound was not performed today due to
Bill For Evaluation ([id number])?: Yes

## 2023-09-07 ENCOUNTER — APPOINTMENT (OUTPATIENT)
Dept: URBAN - METROPOLITAN AREA CLINIC 259 | Age: 83
Setting detail: DERMATOLOGY
End: 2023-09-07

## 2023-09-07 PROBLEM — C44.399 OTHER SPECIFIED MALIGNANT NEOPLASM OF SKIN OF OTHER PARTS OF FACE: Status: ACTIVE | Noted: 2023-09-07

## 2023-09-07 PROBLEM — C44.319 BASAL CELL CARCINOMA OF SKIN OF OTHER PARTS OF FACE: Status: ACTIVE | Noted: 2023-09-07

## 2023-09-07 PROBLEM — C44.01 BASAL CELL CARCINOMA OF SKIN OF LIP: Status: ACTIVE | Noted: 2023-09-07

## 2023-09-07 PROBLEM — C44.311 BASAL CELL CARCINOMA OF SKIN OF NOSE: Status: ACTIVE | Noted: 2023-09-07

## 2023-09-07 PROCEDURE — G6001 ECHO GUIDANCE RADIOTHERAPY: HCPCS | Mod: XU

## 2023-09-07 PROCEDURE — OTHER IMAGE GUIDED - SUPERFICIAL RADIOTHERAPY: TREATMENT VISIT: OTHER

## 2023-09-07 PROCEDURE — OTHER IMAGE GUIDED - SUPERFICIAL RADIOTHERAPY: OTHER

## 2023-09-07 PROCEDURE — 77280 THER RAD SIMULAJ FIELD SMPL: CPT

## 2023-09-07 PROCEDURE — 77401 RADIATION TX DELIVERY SUPFC: CPT

## 2023-09-07 NOTE — PROCEDURE: IMAGE GUIDED - SUPERFICIAL RADIOTHERAPY: TREATMENT VISIT
Calculate Total Cumulative Dose Automatically Or Manually: Manually
Show Ultrasound In Note?: Yes
Total Cumulative Dose (Cgy): 1614.60
Additional Change To Daily Dosage Administered Mid Treatment?: No
Add X Modifier?: JURADO - Unusual Non-Overlapping Service
Ultrasound Used Text: High frequency ultrasound depth is 1.62 mm, which is 0.21 mm in difference from previous imaging.
Prescription Used: 1
Ultrasound Not Used Text: Ultrasound was not performed today due to
Fraction Number: 6
Treatment Documentation: This patient has been treated today with image-guided superficial radiation therapy for non-melanoma skin cancer. Written informed consent has been previously obtained from this patient for this treatment. This consent is documented in the patient's chart. The patient gave verbal consent to continue treatment today. The patient was treated with a specific radiation dose and setup as prescribed by the provider listed on this visit note.  A Radiation Therapist performed administration of radiation under the supervision of a provider. The treatment parameters and cumulative dose are indicated above. Prior to administering the radiation, the patient underwent a verification therapeutic radiology simulation-aided field setting defining relevant normal and abnormal target anatomy and acquiring images with a separate and distinct diagnostic high-frequency ultrasound to delineate tissues and determine whether to proceed with delivery of therapeutic, in addition to retrieve data necessary to develop an optimal radiation treatment process for the patient. The field placement simulation documents any change seen in overall tumor volume documented in the patient’s record, allows the clinician to indicate any needed changes in the treatment plan and/or prescription, provides diagnostic evaluation as the basis for performing the therapeutic procedure, and clearly identifies the information needed to decide to proceed with the therapeutic procedure. This process includes verification of the treatment port(s) and proper treatment positioning. All treatment ports were photographed within electronic medical records. The patient's lead blocking along with gross tumor volume and margin was confirmed. Considering superficial radiotherapy is clinical in setup, this requires the physician and radiation therapist to clarify the location interest being treated against initial images, ultrasound, pathology, and patient anatomy. Care was taken to ensure laboy treated were geometrically accurate and properly positioned using therapeutic radiology simulation-aided field setting verification per fraction. This process is also utilized to determine if any prescription or setup changes are necessary. These steps are therefore medically necessary to ensure safe and effective administration of radiation. Ongoing therapeutic radiology simulation-aided field setting verification is ordered throughout the course of therapy.  \\n\\nA high-frequency ultrasound image was acquired today for a two-dimensional evaluation of the tumor volume, depth, width, breadth, review of prior response to treatment, provide geometric accuracy of field placement, and determine whether to proceed with therapeutic delivery.\\n\\nThe field placement and ultrasound imaging, per fraction, is separate and distinct from the initial simulation and is an important task in providing safe administration of superficial radiation therapy. Physician evaluation of the ultrasound information will be ongoing throughout the course of treatment and is deemed medically necessary to ensure the efficacy of treatment, whether to proceed with therapeutic delivery, and determine any necessary changes. Today's images were evaluated for determination of continuation of treatment with the current plan or with necessary changes as appropriate. Additionally, the use of ultrasound visualization and targeted assessment allows the patient to be able to see his or her cancer(s) progress, encouraging the patient to complete and maintain compliance through the full course of prescribed radiotherapy. Per Dr. Ospina, continued ultrasound guidance and therapeutic radiology simulation-aided field setting verification per fraction is required for field placement, measurement of tumor depth, tissues evaluation, progress, acute effect monitoring, and determination for therapeutic treatment delivery is appropriate.
Energy (Kv): 100
Daily Dosage (Cgy): 269.10
Treatment Documentation: This patient has been treated today with image-guided superficial radiation therapy for non-melanoma skin cancer. Written informed consent has been previously obtained from this patient for this treatment. This consent is documented in the patient's chart. The patient gave verbal consent to continue treatment today. The patient was treated with a specific radiation dose and setup as prescribed by the provider listed on this visit note.  A Radiation Therapist performed administration of radiation under the supervision of a provider. The treatment parameters and cumulative dose are indicated above. Prior to administering the radiation, the patient underwent a verification therapeutic radiology simulation-aided field setting defining relevant normal and abnormal target anatomy and acquiring images with a separate and distinct diagnostic high-frequency ultrasound to delineate tissues and determine whether to proceed with delivery of therapeutic, in addition to retrieve data necessary to develop an optimal radiation treatment process for the patient. The field placement simulation documents any change seen in overall tumor volume documented in the patient’s record, allows the clinician to indicate any needed changes in the treatment plan and/or prescription, provides diagnostic evaluation as the basis for performing the therapeutic procedure, and clearly identifies the information needed to decide to proceed with the therapeutic procedure. This process includes verification of the treatment port(s) and proper treatment positioning. All treatment ports were photographed within electronic medical records. The patient's lead blocking along with gross tumor volume and margin was confirmed. Considering superficial radiotherapy is clinical in setup, this requires the physician and radiation therapist to clarify the location interest being treated against initial images, ultrasound, pathology, and patient anatomy. Care was taken to ensure laboy treated were geometrically accurate and properly positioned using therapeutic radiology simulation-aided field setting verification per fraction. This process is also utilized to determine if any prescription or setup changes are necessary. These steps are therefore medically necessary to ensure safe and effective administration of radiation. Ongoing therapeutic radiology simulation-aided field setting verification is ordered throughout the course of therapy.  \\n\\nA high-frequency ultrasound image was acquired today for a two-dimensional evaluation of the tumor volume, depth, width, breadth, review of prior response to treatment, provide geometric accuracy of field placement, and determine whether to proceed with therapeutic delivery.\\n\\nThe field placement and ultrasound imaging, per fraction, is separate and distinct from the initial simulation and is an important task in providing safe administration of superficial radiation therapy. Physician evaluation of the ultrasound information will be ongoing throughout the course of treatment and is deemed medically necessary to ensure the efficacy of treatment, whether to proceed with therapeutic delivery, and determine any necessary changes. Today's images were evaluated for determination of continuation of treatment with the current plan or with necessary changes as appropriate. Additionally, the use of ultrasound visualization and targeted assessment allows the patient to be able to see his or her cancer(s) progress, encouraging the patient to complete and maintain compliance through the full course of prescribed radiotherapy. Per Dr. Ospina, continued ultrasound guidance and therapeutic radiology simulation-aided field setting verification per fraction is required for field placement, measurement of tumor depth, tissues evaluation, progress, acute effect monitoring, and determination for therapeutic treatment delivery is appropriate.
Daily Dosage (Cgy): 267.46
Total Cumulative Dose (Cgy): 1604.76
Ultrasound Used Text: High frequency ultrasound depth is 1.10 mm, which is 0.04 mm in difference from previous imaging.
Daily Dosage (Cgy): 264.04
Energy (Kv): 70
Total Cumulative Dose (Cgy): 1584.24
Ultrasound Used Text: High frequency ultrasound depth is 0.98 mm, which is 0.20 mm in difference from previous imaging.
Treatment Documentation: This patient has been treated today with image-guided superficial radiation therapy for non-melanoma skin cancer. Written informed consent has been previously obtained from this patient for this treatment. This consent is documented in the patient's chart. The patient gave verbal consent to continue treatment today. The patient was treated with a specific radiation dose and setup as prescribed by the provider listed on this visit note.  A Radiation Therapist performed administration of radiation under the supervision of a provider. The treatment parameters and cumulative dose are indicated above. Prior to administering the radiation, the patient underwent a verification therapeutic radiology simulation-aided field setting defining relevant normal and abnormal target anatomy and acquiring images with a separate and distinct diagnostic high-frequency ultrasound to delineate tissues and determine whether to proceed with delivery of therapeutic, in addition to retrieve data necessary to develop an optimal radiation treatment process for the patient. The field placement simulation documents any change seen in overall tumor volume documented in the patient’s record, allows the clinician to indicate any needed changes in the treatment plan and/or prescription, provides diagnostic evaluation as the basis for performing the therapeutic procedure, and clearly identifies the information needed to decide to proceed with the therapeutic procedure. This process includes verification of the treatment port(s) and proper treatment positioning. All treatment ports were photographed within electronic medical records. The patient's lead blocking along with gross tumor volume and margin was confirmed. Considering superficial radiotherapy is clinical in setup, this requires the physician and radiation therapist to clarify the location interest being treated against initial images, ultrasound, pathology, and patient anatomy. Care was taken to ensure laboy treated were geometrically accurate and properly positioned using therapeutic radiology simulation-aided field setting verification per fraction. This process is also utilized to determine if any prescription or setup changes are necessary. These steps are therefore medically necessary to ensure safe and effective administration of radiation. Ongoing therapeutic radiology simulation-aided field setting verification is ordered throughout the course of therapy.  \\n\\nA high-frequency ultrasound image was acquired today for a two-dimensional evaluation of the tumor volume, depth, width, breadth, review of prior response to treatment, provide geometric accuracy of field placement, and determine whether to proceed with therapeutic delivery.\\n\\nThe field placement and ultrasound imaging, per fraction, is separate and distinct from the initial simulation and is an important task in providing safe administration of superficial radiation therapy. Physician evaluation of the ultrasound information will be ongoing throughout the course of treatment and is deemed medically necessary to ensure the efficacy of treatment, whether to proceed with therapeutic delivery, and determine any necessary changes. Today's images were evaluated for determination of continuation of treatment with the current plan or with necessary changes as appropriate. Additionally, the use of ultrasound visualization and targeted assessment allows the patient to be able to see his or her cancer(s) progress, encouraging the patient to complete and maintain compliance through the full course of prescribed radiotherapy. Per Dr. Ospina, continued ultrasound guidance and therapeutic radiology simulation-aided field setting verification per fraction is required for field placement, measurement of tumor depth, tissues evaluation, progress, acute effect monitoring, and determination for therapeutic treatment delivery is appropriate.
Treatment Documentation: This patient has been treated today with image-guided superficial radiation therapy for non-melanoma skin cancer. Written informed consent has been previously obtained from this patient for this treatment. This consent is documented in the patient's chart. The patient gave verbal consent to continue treatment today. The patient was treated with a specific radiation dose and setup as prescribed by the provider listed on this visit note.  A Radiation Therapist performed administration of radiation under the supervision of a provider. The treatment parameters and cumulative dose are indicated above. Prior to administering the radiation, the patient underwent a verification therapeutic radiology simulation-aided field setting defining relevant normal and abnormal target anatomy and acquiring images with a separate and distinct diagnostic high-frequency ultrasound to delineate tissues and determine whether to proceed with delivery of therapeutic, in addition to retrieve data necessary to develop an optimal radiation treatment process for the patient. The field placement simulation documents any change seen in overall tumor volume documented in the patient’s record, allows the clinician to indicate any needed changes in the treatment plan and/or prescription, provides diagnostic evaluation as the basis for performing the therapeutic procedure, and clearly identifies the information needed to decide to proceed with the therapeutic procedure. This process includes verification of the treatment port(s) and proper treatment positioning. All treatment ports were photographed within electronic medical records. The patient's lead blocking along with gross tumor volume and margin was confirmed. Considering superficial radiotherapy is clinical in setup, this requires the physician and radiation therapist to clarify the location interest being treated against initial images, ultrasound, pathology, and patient anatomy. Care was taken to ensure laboy treated were geometrically accurate and properly positioned using therapeutic radiology simulation-aided field setting verification per fraction. This process is also utilized to determine if any prescription or setup changes are necessary. These steps are therefore medically necessary to ensure safe and effective administration of radiation. Ongoing therapeutic radiology simulation-aided field setting verification is ordered throughout the course of therapy.  \\n\\nA high-frequency ultrasound image was acquired today for a two-dimensional evaluation of the tumor volume, depth, width, breadth, review of prior response to treatment, provide geometric accuracy of field placement, and determine whether to proceed with therapeutic delivery.\\n\\nThe field placement and ultrasound imaging, per fraction, is separate and distinct from the initial simulation and is an important task in providing safe administration of superficial radiation therapy. Physician evaluation of the ultrasound information will be ongoing throughout the course of treatment and is deemed medically necessary to ensure the efficacy of treatment, whether to proceed with therapeutic delivery, and determine any necessary changes. Today's images were evaluated for determination of continuation of treatment with the current plan or with necessary changes as appropriate. Additionally, the use of ultrasound visualization and targeted assessment allows the patient to be able to see his or her cancer(s) progress, encouraging the patient to complete and maintain compliance through the full course of prescribed radiotherapy. Per Dr. Ospina, continued ultrasound guidance and therapeutic radiology simulation-aided field setting verification per fraction is required for field placement, measurement of tumor depth, tissues evaluation, progress, acute effect monitoring, and determination for therapeutic treatment delivery is appropriate.
Ultrasound Used Text: High frequency ultrasound depth is 1.91 mm, which is 0.31 mm in difference from previous imaging.

## 2023-09-07 NOTE — PROCEDURE: IMAGE GUIDED - SUPERFICIAL RADIOTHERAPY
Body Location Override (Optional): Left Nasal Ala
Detail Level: Detailed
English
Pathology Override (Pathology Will Render As Diagnosis Name If Left Blank): Primary Infiltrative Basal Cell Carcinoma
Field Number: 1
Lesion Dimensions-X Axis In Cm: 0.8
Lesion Dimensions-Y Axis In Cm: 0.9
Lesion Margin Size In Cm: 0.5
Applicator Size In Cm: 1.5 cm
Energy (Kv): 100
Treatment Time (Min): 0.46
Time, Dose, Fractionation Factor (Tdf) For Prescription 1: 99
Daily Dose (Cgy): 269.10
Number Of Fractions For Prescription 1: 20
Treatments Per Week: 4
Total Dose For Prescription 1 (Cgy): 5382.00
Add A Second Prescription?: No
Additional Fraction(S) Needed:: 0
Bill For Physics Consultation: No - Render Text Only
Physics Documentation: (Physics Check Verbiage)
Body Location Override (Optional): Left Central Zygoma
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
Field Number: 2
Lesion Dimensions-X Axis In Cm: 0.7
Shield Size In Cm: 1.7 X 1.7
Applicator Size In Cm: 2.5 cm
Treatment Time (Min): 0.43
Time, Dose, Fractionation Factor (Tdf) For Prescription 1: 98
Daily Dose (Cgy): 267.46
Total Dose For Prescription 1 (Cgy): 5349.20
Body Location Override (Optional): Left Superior Medial Forehead
Pathology Override (Pathology Will Render As Diagnosis Name If Left Blank): Primary Nodular Basal Cell Carcinoma
Field Number: 3
Shield Size In Cm: 2.0 x 2.0
Energy (Kv): 70
Treatment Time (Min): 0.41
Time, Dose, Fractionation Factor (Tdf) For Prescription 1: 96
Daily Dose (Cgy): 264.04
Total Dose For Prescription 1 (Cgy): 5280.80
Body Location Override (Optional): Right Nasolabial Fold

## 2023-09-09 ENCOUNTER — APPOINTMENT (OUTPATIENT)
Dept: URBAN - METROPOLITAN AREA CLINIC 259 | Age: 83
Setting detail: DERMATOLOGY
End: 2023-09-09

## 2023-09-09 PROBLEM — C44.01 BASAL CELL CARCINOMA OF SKIN OF LIP: Status: ACTIVE | Noted: 2023-09-09

## 2023-09-09 PROBLEM — C44.311 BASAL CELL CARCINOMA OF SKIN OF NOSE: Status: ACTIVE | Noted: 2023-09-09

## 2023-09-09 PROBLEM — C44.399 OTHER SPECIFIED MALIGNANT NEOPLASM OF SKIN OF OTHER PARTS OF FACE: Status: ACTIVE | Noted: 2023-09-09

## 2023-09-09 PROBLEM — C44.319 BASAL CELL CARCINOMA OF SKIN OF OTHER PARTS OF FACE: Status: ACTIVE | Noted: 2023-09-09

## 2023-09-09 PROCEDURE — OTHER IMAGE GUIDED - SUPERFICIAL RADIOTHERAPY: OTHER

## 2023-09-09 PROCEDURE — 77336 RADIATION PHYSICS CONSULT: CPT

## 2023-09-09 NOTE — PROCEDURE: IMAGE GUIDED - SUPERFICIAL RADIOTHERAPY
Body Location Override (Optional): Left Nasal Ala
Detail Level: Detailed
Pathology Override (Pathology Will Render As Diagnosis Name If Left Blank): Primary Infiltrative Basal Cell Carcinoma
Field Number: 1
Lesion Dimensions-X Axis In Cm: 0.8
Lesion Dimensions-Y Axis In Cm: 0.9
Lesion Margin Size In Cm: 0.5
Applicator Size In Cm: 1.5 cm
Energy (Kv): 100
Treatment Time (Min): 0.46
Time, Dose, Fractionation Factor (Tdf) For Prescription 1: 99
Daily Dose (Cgy): 269.10
Number Of Fractions For Prescription 1: 20
Treatments Per Week: 4
Total Dose For Prescription 1 (Cgy): 5382.00
Add A Second Prescription?: No
Additional Fraction(S) Needed:: 0
Add Physics Consultation: Yes
Physics Consultation Performed For Fractions:: 1-6
Physics Documentation: Per the request of Dr. Ospina, continuing medical physics review as per radiotherapy standard of care post every 5th fraction for patient, including assessment of treatment parameters,  of dose delivery, and review of patient treatment documentation in support of the provider, to ensure efficacy and continued safe delivery of radiotherapy. Included in physics check is review of patient setup information, all pertinent simulation and treatment photographs checks, prescription, dose calculation verification, per fraction dose charted correctly, elapsed days and treatment days correctly charted, cumulative dose correct, and review of any prescription changes. Patient was not present, nor was it necessary for the patient to be present for weekly physics review and no other superficial radiotherapy services were rendered on this day. Continued medical physics review post every 5th fraction of therapy is requested by provider for appropriate radiotherapy management and is deemed medically necessary and standard of care.
Body Location Override (Optional): Left Central Zygoma
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
Field Number: 2
Lesion Dimensions-X Axis In Cm: 0.7
Shield Size In Cm: 1.7 X 1.7
Applicator Size In Cm: 2.5 cm
Treatment Time (Min): 0.43
Time, Dose, Fractionation Factor (Tdf) For Prescription 1: 98
Daily Dose (Cgy): 267.46
Total Dose For Prescription 1 (Cgy): 5349.20
Physics Documentation: Per the request of Dr. Ospina, continuing medical physics review as per radiotherapy standard of care post every 5th fraction for patient, including assessment of treatment parameters,  of dose delivery, and review of patient treatment documentation in support of the provider, to ensure efficacy and continued safe delivery of radiotherapy. Included in physics check is review of patient setup information, all pertinent simulation and treatment photographs checks, prescription, dose calculation verification, per fraction dose charted correctly, elapsed days and treatment days correctly charted, cumulative dose correct, and review of any prescription changes. Patient was not present, nor was it necessary for the patient to be present for weekly physics review and no other superficial radiotherapy services were rendered on this day. Continued medical physics review post every 5th fraction of therapy is requested by provider for appropriate radiotherapy management and is deemed medically necessary and standard of care.
Body Location Override (Optional): Left Superior Medial Forehead
Pathology Override (Pathology Will Render As Diagnosis Name If Left Blank): Primary Nodular Basal Cell Carcinoma
Field Number: 3
Shield Size In Cm: 2.0 x 2.0
Energy (Kv): 70
Treatment Time (Min): 0.41
Time, Dose, Fractionation Factor (Tdf) For Prescription 1: 96
Daily Dose (Cgy): 264.04
Total Dose For Prescription 1 (Cgy): 5280.80
Physics Documentation: Per the request of Dr. Ospina, continuing medical physics review as per radiotherapy standard of care post every 5th fraction for patient, including assessment of treatment parameters,  of dose delivery, and review of patient treatment documentation in support of the provider, to ensure efficacy and continued safe delivery of radiotherapy. Included in physics check is review of patient setup information, all pertinent simulation and treatment photographs checks, prescription, dose calculation verification, per fraction dose charted correctly, elapsed days and treatment days correctly charted, cumulative dose correct, and review of any prescription changes. Patient was not present, nor was it necessary for the patient to be present for weekly physics review and no other superficial radiotherapy services were rendered on this day. Continued medical physics review post every 5th fraction of therapy is requested by provider for appropriate radiotherapy management and is deemed medically necessary and standard of care.
Body Location Override (Optional): Right Nasolabial Fold
Physics Documentation: Per the request of Dr. Ospina, continuing medical physics review as per radiotherapy standard of care post every 5th fraction for patient, including assessment of treatment parameters,  of dose delivery, and review of patient treatment documentation in support of the provider, to ensure efficacy and continued safe delivery of radiotherapy. Included in physics check is review of patient setup information, all pertinent simulation and treatment photographs checks, prescription, dose calculation verification, per fraction dose charted correctly, elapsed days and treatment days correctly charted, cumulative dose correct, and review of any prescription changes. Patient was not present, nor was it necessary for the patient to be present for weekly physics review and no other superficial radiotherapy services were rendered on this day. Continued medical physics review post every 5th fraction of therapy is requested by provider for appropriate radiotherapy management and is deemed medically necessary and standard of care.

## 2023-09-11 ENCOUNTER — APPOINTMENT (OUTPATIENT)
Dept: URBAN - METROPOLITAN AREA CLINIC 259 | Age: 83
Setting detail: DERMATOLOGY
End: 2023-09-14

## 2023-09-11 PROBLEM — C44.399 OTHER SPECIFIED MALIGNANT NEOPLASM OF SKIN OF OTHER PARTS OF FACE: Status: ACTIVE | Noted: 2023-09-11

## 2023-09-11 PROBLEM — C44.01 BASAL CELL CARCINOMA OF SKIN OF LIP: Status: ACTIVE | Noted: 2023-09-11

## 2023-09-11 PROBLEM — C44.319 BASAL CELL CARCINOMA OF SKIN OF OTHER PARTS OF FACE: Status: ACTIVE | Noted: 2023-09-11

## 2023-09-11 PROBLEM — C44.311 BASAL CELL CARCINOMA OF SKIN OF NOSE: Status: ACTIVE | Noted: 2023-09-11

## 2023-09-11 PROCEDURE — G6001 ECHO GUIDANCE RADIOTHERAPY: HCPCS | Mod: XU

## 2023-09-11 PROCEDURE — OTHER IMAGE GUIDED - SUPERFICIAL RADIOTHERAPY: TREATMENT VISIT: OTHER

## 2023-09-11 PROCEDURE — 77280 THER RAD SIMULAJ FIELD SMPL: CPT

## 2023-09-11 PROCEDURE — OTHER IMAGE GUIDED - SUPERFICIAL RADIOTHERAPY: OTHER

## 2023-09-11 PROCEDURE — 77401 RADIATION TX DELIVERY SUPFC: CPT

## 2023-09-11 NOTE — PROCEDURE: IMAGE GUIDED - SUPERFICIAL RADIOTHERAPY: TREATMENT VISIT
Calculate Total Cumulative Dose Automatically Or Manually: Manually
Show Ultrasound In Note?: Yes
Total Cumulative Dose (Cgy): 1883.70
Additional Change To Daily Dosage Administered Mid Treatment?: No
Add X Modifier?: JURADO - Unusual Non-Overlapping Service
Ultrasound Used Text: High frequency ultrasound depth is 2.97 mm, which is 1.35 mm in difference from previous imaging.
Prescription Used: 1
Ultrasound Not Used Text: Ultrasound was not performed today due to
Fraction Number: 7
Treatment Documentation: This patient has been treated today with image-guided superficial radiation therapy for non-melanoma skin cancer. Written informed consent has been previously obtained from this patient for this treatment. This consent is documented in the patient's chart. The patient gave verbal consent to continue treatment today. The patient was treated with a specific radiation dose and setup as prescribed by the provider listed on this visit note.  A Radiation Therapist performed administration of radiation under the supervision of a provider. The treatment parameters and cumulative dose are indicated above. Prior to administering the radiation, the patient underwent a verification therapeutic radiology simulation-aided field setting defining relevant normal and abnormal target anatomy and acquiring images with a separate and distinct diagnostic high-frequency ultrasound to delineate tissues and determine whether to proceed with delivery of therapeutic, in addition to retrieve data necessary to develop an optimal radiation treatment process for the patient. The field placement simulation documents any change seen in overall tumor volume documented in the patient’s record, allows the clinician to indicate any needed changes in the treatment plan and/or prescription, provides diagnostic evaluation as the basis for performing the therapeutic procedure, and clearly identifies the information needed to decide to proceed with the therapeutic procedure. This process includes verification of the treatment port(s) and proper treatment positioning. All treatment ports were photographed within electronic medical records. The patient's lead blocking along with gross tumor volume and margin was confirmed. Considering superficial radiotherapy is clinical in setup, this requires the physician and radiation therapist to clarify the location interest being treated against initial images, ultrasound, pathology, and patient anatomy. Care was taken to ensure laboy treated were geometrically accurate and properly positioned using therapeutic radiology simulation-aided field setting verification per fraction. This process is also utilized to determine if any prescription or setup changes are necessary. These steps are therefore medically necessary to ensure safe and effective administration of radiation. Ongoing therapeutic radiology simulation-aided field setting verification is ordered throughout the course of therapy.  \\n\\nA high-frequency ultrasound image was acquired today for a two-dimensional evaluation of the tumor volume, depth, width, breadth, review of prior response to treatment, provide geometric accuracy of field placement, and determine whether to proceed with therapeutic delivery.\\n\\nThe field placement and ultrasound imaging, per fraction, is separate and distinct from the initial simulation and is an important task in providing safe administration of superficial radiation therapy. Physician evaluation of the ultrasound information will be ongoing throughout the course of treatment and is deemed medically necessary to ensure the efficacy of treatment, whether to proceed with therapeutic delivery, and determine any necessary changes. Today's images were evaluated for determination of continuation of treatment with the current plan or with necessary changes as appropriate. Additionally, the use of ultrasound visualization and targeted assessment allows the patient to be able to see his or her cancer(s) progress, encouraging the patient to complete and maintain compliance through the full course of prescribed radiotherapy. Per Dr. Ospina, continued ultrasound guidance and therapeutic radiology simulation-aided field setting verification per fraction is required for field placement, measurement of tumor depth, tissues evaluation, progress, acute effect monitoring, and determination for therapeutic treatment delivery is appropriate.
Energy (Kv): 100
Daily Dosage (Cgy): 269.10
Additional Comments (Add Customization Of Note Here): The patient was asked if he has noticed any change in sensation to the treatment area, the patient states that he has not noticed any change in sensation.
Treatment Documentation: This patient has been treated today with image-guided superficial radiation therapy for non-melanoma skin cancer. Written informed consent has been previously obtained from this patient for this treatment. This consent is documented in the patient's chart. The patient gave verbal consent to continue treatment today. The patient was treated with a specific radiation dose and setup as prescribed by the provider listed on this visit note.  A Radiation Therapist performed administration of radiation under the supervision of a provider. The treatment parameters and cumulative dose are indicated above. Prior to administering the radiation, the patient underwent a verification therapeutic radiology simulation-aided field setting defining relevant normal and abnormal target anatomy and acquiring images with a separate and distinct diagnostic high-frequency ultrasound to delineate tissues and determine whether to proceed with delivery of therapeutic, in addition to retrieve data necessary to develop an optimal radiation treatment process for the patient. The field placement simulation documents any change seen in overall tumor volume documented in the patient’s record, allows the clinician to indicate any needed changes in the treatment plan and/or prescription, provides diagnostic evaluation as the basis for performing the therapeutic procedure, and clearly identifies the information needed to decide to proceed with the therapeutic procedure. This process includes verification of the treatment port(s) and proper treatment positioning. All treatment ports were photographed within electronic medical records. The patient's lead blocking along with gross tumor volume and margin was confirmed. Considering superficial radiotherapy is clinical in setup, this requires the physician and radiation therapist to clarify the location interest being treated against initial images, ultrasound, pathology, and patient anatomy. Care was taken to ensure laboy treated were geometrically accurate and properly positioned using therapeutic radiology simulation-aided field setting verification per fraction. This process is also utilized to determine if any prescription or setup changes are necessary. These steps are therefore medically necessary to ensure safe and effective administration of radiation. Ongoing therapeutic radiology simulation-aided field setting verification is ordered throughout the course of therapy.  \\n\\nA high-frequency ultrasound image was acquired today for a two-dimensional evaluation of the tumor volume, depth, width, breadth, review of prior response to treatment, provide geometric accuracy of field placement, and determine whether to proceed with therapeutic delivery.\\n\\nThe field placement and ultrasound imaging, per fraction, is separate and distinct from the initial simulation and is an important task in providing safe administration of superficial radiation therapy. Physician evaluation of the ultrasound information will be ongoing throughout the course of treatment and is deemed medically necessary to ensure the efficacy of treatment, whether to proceed with therapeutic delivery, and determine any necessary changes. Today's images were evaluated for determination of continuation of treatment with the current plan or with necessary changes as appropriate. Additionally, the use of ultrasound visualization and targeted assessment allows the patient to be able to see his or her cancer(s) progress, encouraging the patient to complete and maintain compliance through the full course of prescribed radiotherapy. Per Dr. Ospina, continued ultrasound guidance and therapeutic radiology simulation-aided field setting verification per fraction is required for field placement, measurement of tumor depth, tissues evaluation, progress, acute effect monitoring, and determination for therapeutic treatment delivery is appropriate.
Daily Dosage (Cgy): 267.46
Total Cumulative Dose (Cgy): 1872.22
Ultrasound Used Text: High frequency ultrasound depth is 1.24 mm, which is 0.14 mm in difference from previous imaging.
Daily Dosage (Cgy): 264.04
Energy (Kv): 70
Total Cumulative Dose (Cgy): 1848.28
Ultrasound Used Text: High frequency ultrasound depth is 1.00 mm, which is 0.02 mm in difference from previous imaging.
Treatment Documentation: This patient has been treated today with image-guided superficial radiation therapy for non-melanoma skin cancer. Written informed consent has been previously obtained from this patient for this treatment. This consent is documented in the patient's chart. The patient gave verbal consent to continue treatment today. The patient was treated with a specific radiation dose and setup as prescribed by the provider listed on this visit note.  A Radiation Therapist performed administration of radiation under the supervision of a provider. The treatment parameters and cumulative dose are indicated above. Prior to administering the radiation, the patient underwent a verification therapeutic radiology simulation-aided field setting defining relevant normal and abnormal target anatomy and acquiring images with a separate and distinct diagnostic high-frequency ultrasound to delineate tissues and determine whether to proceed with delivery of therapeutic, in addition to retrieve data necessary to develop an optimal radiation treatment process for the patient. The field placement simulation documents any change seen in overall tumor volume documented in the patient’s record, allows the clinician to indicate any needed changes in the treatment plan and/or prescription, provides diagnostic evaluation as the basis for performing the therapeutic procedure, and clearly identifies the information needed to decide to proceed with the therapeutic procedure. This process includes verification of the treatment port(s) and proper treatment positioning. All treatment ports were photographed within electronic medical records. The patient's lead blocking along with gross tumor volume and margin was confirmed. Considering superficial radiotherapy is clinical in setup, this requires the physician and radiation therapist to clarify the location interest being treated against initial images, ultrasound, pathology, and patient anatomy. Care was taken to ensure laboy treated were geometrically accurate and properly positioned using therapeutic radiology simulation-aided field setting verification per fraction. This process is also utilized to determine if any prescription or setup changes are necessary. These steps are therefore medically necessary to ensure safe and effective administration of radiation. Ongoing therapeutic radiology simulation-aided field setting verification is ordered throughout the course of therapy.  \\n\\nA high-frequency ultrasound image was acquired today for a two-dimensional evaluation of the tumor volume, depth, width, breadth, review of prior response to treatment, provide geometric accuracy of field placement, and determine whether to proceed with therapeutic delivery.\\n\\nThe field placement and ultrasound imaging, per fraction, is separate and distinct from the initial simulation and is an important task in providing safe administration of superficial radiation therapy. Physician evaluation of the ultrasound information will be ongoing throughout the course of treatment and is deemed medically necessary to ensure the efficacy of treatment, whether to proceed with therapeutic delivery, and determine any necessary changes. Today's images were evaluated for determination of continuation of treatment with the current plan or with necessary changes as appropriate. Additionally, the use of ultrasound visualization and targeted assessment allows the patient to be able to see his or her cancer(s) progress, encouraging the patient to complete and maintain compliance through the full course of prescribed radiotherapy. Per Dr. Ospina, continued ultrasound guidance and therapeutic radiology simulation-aided field setting verification per fraction is required for field placement, measurement of tumor depth, tissues evaluation, progress, acute effect monitoring, and determination for therapeutic treatment delivery is appropriate.
Treatment Documentation: This patient has been treated today with image-guided superficial radiation therapy for non-melanoma skin cancer. Written informed consent has been previously obtained from this patient for this treatment. This consent is documented in the patient's chart. The patient gave verbal consent to continue treatment today. The patient was treated with a specific radiation dose and setup as prescribed by the provider listed on this visit note.  A Radiation Therapist performed administration of radiation under the supervision of a provider. The treatment parameters and cumulative dose are indicated above. Prior to administering the radiation, the patient underwent a verification therapeutic radiology simulation-aided field setting defining relevant normal and abnormal target anatomy and acquiring images with a separate and distinct diagnostic high-frequency ultrasound to delineate tissues and determine whether to proceed with delivery of therapeutic, in addition to retrieve data necessary to develop an optimal radiation treatment process for the patient. The field placement simulation documents any change seen in overall tumor volume documented in the patient’s record, allows the clinician to indicate any needed changes in the treatment plan and/or prescription, provides diagnostic evaluation as the basis for performing the therapeutic procedure, and clearly identifies the information needed to decide to proceed with the therapeutic procedure. This process includes verification of the treatment port(s) and proper treatment positioning. All treatment ports were photographed within electronic medical records. The patient's lead blocking along with gross tumor volume and margin was confirmed. Considering superficial radiotherapy is clinical in setup, this requires the physician and radiation therapist to clarify the location interest being treated against initial images, ultrasound, pathology, and patient anatomy. Care was taken to ensure laboy treated were geometrically accurate and properly positioned using therapeutic radiology simulation-aided field setting verification per fraction. This process is also utilized to determine if any prescription or setup changes are necessary. These steps are therefore medically necessary to ensure safe and effective administration of radiation. Ongoing therapeutic radiology simulation-aided field setting verification is ordered throughout the course of therapy.  \\n\\nA high-frequency ultrasound image was acquired today for a two-dimensional evaluation of the tumor volume, depth, width, breadth, review of prior response to treatment, provide geometric accuracy of field placement, and determine whether to proceed with therapeutic delivery.\\n\\nThe field placement and ultrasound imaging, per fraction, is separate and distinct from the initial simulation and is an important task in providing safe administration of superficial radiation therapy. Physician evaluation of the ultrasound information will be ongoing throughout the course of treatment and is deemed medically necessary to ensure the efficacy of treatment, whether to proceed with therapeutic delivery, and determine any necessary changes. Today's images were evaluated for determination of continuation of treatment with the current plan or with necessary changes as appropriate. Additionally, the use of ultrasound visualization and targeted assessment allows the patient to be able to see his or her cancer(s) progress, encouraging the patient to complete and maintain compliance through the full course of prescribed radiotherapy. Per Dr. Ospina, continued ultrasound guidance and therapeutic radiology simulation-aided field setting verification per fraction is required for field placement, measurement of tumor depth, tissues evaluation, progress, acute effect monitoring, and determination for therapeutic treatment delivery is appropriate.
Additional Comments (Add Customization Of Note Here): The patient was asked if he has noticed any change in sensation to the treatment area, the patient states that he continues to have a scab that falls off then reappears at the biopsy site.
Ultrasound Used Text: High frequency ultrasound depth is 1.78 mm, which is 0.13 mm in difference from previous imaging.

## 2023-09-12 ENCOUNTER — APPOINTMENT (OUTPATIENT)
Dept: URBAN - METROPOLITAN AREA CLINIC 259 | Age: 83
Setting detail: DERMATOLOGY
End: 2023-09-14

## 2023-09-12 PROBLEM — C44.399 OTHER SPECIFIED MALIGNANT NEOPLASM OF SKIN OF OTHER PARTS OF FACE: Status: ACTIVE | Noted: 2023-09-12

## 2023-09-12 PROBLEM — C44.01 BASAL CELL CARCINOMA OF SKIN OF LIP: Status: ACTIVE | Noted: 2023-09-12

## 2023-09-12 PROBLEM — C44.311 BASAL CELL CARCINOMA OF SKIN OF NOSE: Status: ACTIVE | Noted: 2023-09-12

## 2023-09-12 PROBLEM — C44.319 BASAL CELL CARCINOMA OF SKIN OF OTHER PARTS OF FACE: Status: ACTIVE | Noted: 2023-09-12

## 2023-09-12 PROCEDURE — OTHER IMAGE GUIDED - SUPERFICIAL RADIOTHERAPY: TREATMENT VISIT: OTHER

## 2023-09-12 PROCEDURE — 77401 RADIATION TX DELIVERY SUPFC: CPT

## 2023-09-12 PROCEDURE — OTHER IMAGE GUIDED - SUPERFICIAL RADIOTHERAPY: OTHER

## 2023-09-12 PROCEDURE — G6001 ECHO GUIDANCE RADIOTHERAPY: HCPCS | Mod: XU

## 2023-09-12 PROCEDURE — 77280 THER RAD SIMULAJ FIELD SMPL: CPT

## 2023-09-12 NOTE — PROCEDURE: IMAGE GUIDED - SUPERFICIAL RADIOTHERAPY: TREATMENT VISIT
Calculate Total Cumulative Dose Automatically Or Manually: Manually
Show Ultrasound In Note?: Yes
Total Cumulative Dose (Cgy): 2152.80
Additional Change To Daily Dosage Administered Mid Treatment?: No
Add X Modifier?: JURADO - Unusual Non-Overlapping Service
Ultrasound Used Text: High frequency ultrasound depth is 2.33 mm, which is 0.64 mm in difference from previous imaging.
Prescription Used: 1
Ultrasound Not Used Text: Ultrasound was not performed today due to
Fraction Number: 8
Treatment Documentation: This patient has been treated today with image-guided superficial radiation therapy for non-melanoma skin cancer. Written informed consent has been previously obtained from this patient for this treatment. This consent is documented in the patient's chart. The patient gave verbal consent to continue treatment today. The patient was treated with a specific radiation dose and setup as prescribed by the provider listed on this visit note.  A Radiation Therapist performed administration of radiation under the supervision of a provider. The treatment parameters and cumulative dose are indicated above. Prior to administering the radiation, the patient underwent a verification therapeutic radiology simulation-aided field setting defining relevant normal and abnormal target anatomy and acquiring images with a separate and distinct diagnostic high-frequency ultrasound to delineate tissues and determine whether to proceed with delivery of therapeutic, in addition to retrieve data necessary to develop an optimal radiation treatment process for the patient. The field placement simulation documents any change seen in overall tumor volume documented in the patient’s record, allows the clinician to indicate any needed changes in the treatment plan and/or prescription, provides diagnostic evaluation as the basis for performing the therapeutic procedure, and clearly identifies the information needed to decide to proceed with the therapeutic procedure. This process includes verification of the treatment port(s) and proper treatment positioning. All treatment ports were photographed within electronic medical records. The patient's lead blocking along with gross tumor volume and margin was confirmed. Considering superficial radiotherapy is clinical in setup, this requires the physician and radiation therapist to clarify the location interest being treated against initial images, ultrasound, pathology, and patient anatomy. Care was taken to ensure laboy treated were geometrically accurate and properly positioned using therapeutic radiology simulation-aided field setting verification per fraction. This process is also utilized to determine if any prescription or setup changes are necessary. These steps are therefore medically necessary to ensure safe and effective administration of radiation. Ongoing therapeutic radiology simulation-aided field setting verification is ordered throughout the course of therapy.  \\n\\nA high-frequency ultrasound image was acquired today for a two-dimensional evaluation of the tumor volume, depth, width, breadth, review of prior response to treatment, provide geometric accuracy of field placement, and determine whether to proceed with therapeutic delivery.\\n\\nThe field placement and ultrasound imaging, per fraction, is separate and distinct from the initial simulation and is an important task in providing safe administration of superficial radiation therapy. Physician evaluation of the ultrasound information will be ongoing throughout the course of treatment and is deemed medically necessary to ensure the efficacy of treatment, whether to proceed with therapeutic delivery, and determine any necessary changes. Today's images were evaluated for determination of continuation of treatment with the current plan or with necessary changes as appropriate. Additionally, the use of ultrasound visualization and targeted assessment allows the patient to be able to see his or her cancer(s) progress, encouraging the patient to complete and maintain compliance through the full course of prescribed radiotherapy. Per Dr. Ospina, continued ultrasound guidance and therapeutic radiology simulation-aided field setting verification per fraction is required for field placement, measurement of tumor depth, tissues evaluation, progress, acute effect monitoring, and determination for therapeutic treatment delivery is appropriate.
Energy (Kv): 100
Daily Dosage (Cgy): 269.10
Additional Comments (Add Customization Of Note Here): The patient is doing well today with no questions or concerns regarding his treatments.
Treatment Documentation: This patient has been treated today with image-guided superficial radiation therapy for non-melanoma skin cancer. Written informed consent has been previously obtained from this patient for this treatment. This consent is documented in the patient's chart. The patient gave verbal consent to continue treatment today. The patient was treated with a specific radiation dose and setup as prescribed by the provider listed on this visit note.  A Radiation Therapist performed administration of radiation under the supervision of a provider. The treatment parameters and cumulative dose are indicated above. Prior to administering the radiation, the patient underwent a verification therapeutic radiology simulation-aided field setting defining relevant normal and abnormal target anatomy and acquiring images with a separate and distinct diagnostic high-frequency ultrasound to delineate tissues and determine whether to proceed with delivery of therapeutic, in addition to retrieve data necessary to develop an optimal radiation treatment process for the patient. The field placement simulation documents any change seen in overall tumor volume documented in the patient’s record, allows the clinician to indicate any needed changes in the treatment plan and/or prescription, provides diagnostic evaluation as the basis for performing the therapeutic procedure, and clearly identifies the information needed to decide to proceed with the therapeutic procedure. This process includes verification of the treatment port(s) and proper treatment positioning. All treatment ports were photographed within electronic medical records. The patient's lead blocking along with gross tumor volume and margin was confirmed. Considering superficial radiotherapy is clinical in setup, this requires the physician and radiation therapist to clarify the location interest being treated against initial images, ultrasound, pathology, and patient anatomy. Care was taken to ensure laboy treated were geometrically accurate and properly positioned using therapeutic radiology simulation-aided field setting verification per fraction. This process is also utilized to determine if any prescription or setup changes are necessary. These steps are therefore medically necessary to ensure safe and effective administration of radiation. Ongoing therapeutic radiology simulation-aided field setting verification is ordered throughout the course of therapy.  \\n\\nA high-frequency ultrasound image was acquired today for a two-dimensional evaluation of the tumor volume, depth, width, breadth, review of prior response to treatment, provide geometric accuracy of field placement, and determine whether to proceed with therapeutic delivery.\\n\\nThe field placement and ultrasound imaging, per fraction, is separate and distinct from the initial simulation and is an important task in providing safe administration of superficial radiation therapy. Physician evaluation of the ultrasound information will be ongoing throughout the course of treatment and is deemed medically necessary to ensure the efficacy of treatment, whether to proceed with therapeutic delivery, and determine any necessary changes. Today's images were evaluated for determination of continuation of treatment with the current plan or with necessary changes as appropriate. Additionally, the use of ultrasound visualization and targeted assessment allows the patient to be able to see his or her cancer(s) progress, encouraging the patient to complete and maintain compliance through the full course of prescribed radiotherapy. Per Dr. Ospina, continued ultrasound guidance and therapeutic radiology simulation-aided field setting verification per fraction is required for field placement, measurement of tumor depth, tissues evaluation, progress, acute effect monitoring, and determination for therapeutic treatment delivery is appropriate.
Daily Dosage (Cgy): 267.46
Total Cumulative Dose (Cgy): 2211.68
Ultrasound Used Text: High frequency ultrasound depth is 0.96 mm, which is 0.28 mm in difference from previous imaging.
Daily Dosage (Cgy): 264.04
Energy (Kv): 70
Total Cumulative Dose (Cgy): 2112.32
Ultrasound Used Text: High frequency ultrasound depth is 0.90 mm, which is 0.10 mm in difference from previous imaging.
Treatment Documentation: This patient has been treated today with image-guided superficial radiation therapy for non-melanoma skin cancer. Written informed consent has been previously obtained from this patient for this treatment. This consent is documented in the patient's chart. The patient gave verbal consent to continue treatment today. The patient was treated with a specific radiation dose and setup as prescribed by the provider listed on this visit note.  A Radiation Therapist performed administration of radiation under the supervision of a provider. The treatment parameters and cumulative dose are indicated above. Prior to administering the radiation, the patient underwent a verification therapeutic radiology simulation-aided field setting defining relevant normal and abnormal target anatomy and acquiring images with a separate and distinct diagnostic high-frequency ultrasound to delineate tissues and determine whether to proceed with delivery of therapeutic, in addition to retrieve data necessary to develop an optimal radiation treatment process for the patient. The field placement simulation documents any change seen in overall tumor volume documented in the patient’s record, allows the clinician to indicate any needed changes in the treatment plan and/or prescription, provides diagnostic evaluation as the basis for performing the therapeutic procedure, and clearly identifies the information needed to decide to proceed with the therapeutic procedure. This process includes verification of the treatment port(s) and proper treatment positioning. All treatment ports were photographed within electronic medical records. The patient's lead blocking along with gross tumor volume and margin was confirmed. Considering superficial radiotherapy is clinical in setup, this requires the physician and radiation therapist to clarify the location interest being treated against initial images, ultrasound, pathology, and patient anatomy. Care was taken to ensure laboy treated were geometrically accurate and properly positioned using therapeutic radiology simulation-aided field setting verification per fraction. This process is also utilized to determine if any prescription or setup changes are necessary. These steps are therefore medically necessary to ensure safe and effective administration of radiation. Ongoing therapeutic radiology simulation-aided field setting verification is ordered throughout the course of therapy.  \\n\\nA high-frequency ultrasound image was acquired today for a two-dimensional evaluation of the tumor volume, depth, width, breadth, review of prior response to treatment, provide geometric accuracy of field placement, and determine whether to proceed with therapeutic delivery.\\n\\nThe field placement and ultrasound imaging, per fraction, is separate and distinct from the initial simulation and is an important task in providing safe administration of superficial radiation therapy. Physician evaluation of the ultrasound information will be ongoing throughout the course of treatment and is deemed medically necessary to ensure the efficacy of treatment, whether to proceed with therapeutic delivery, and determine any necessary changes. Today's images were evaluated for determination of continuation of treatment with the current plan or with necessary changes as appropriate. Additionally, the use of ultrasound visualization and targeted assessment allows the patient to be able to see his or her cancer(s) progress, encouraging the patient to complete and maintain compliance through the full course of prescribed radiotherapy. Per Dr. Ospina, continued ultrasound guidance and therapeutic radiology simulation-aided field setting verification per fraction is required for field placement, measurement of tumor depth, tissues evaluation, progress, acute effect monitoring, and determination for therapeutic treatment delivery is appropriate.
Treatment Documentation: This patient has been treated today with image-guided superficial radiation therapy for non-melanoma skin cancer. Written informed consent has been previously obtained from this patient for this treatment. This consent is documented in the patient's chart. The patient gave verbal consent to continue treatment today. The patient was treated with a specific radiation dose and setup as prescribed by the provider listed on this visit note.  A Radiation Therapist performed administration of radiation under the supervision of a provider. The treatment parameters and cumulative dose are indicated above. Prior to administering the radiation, the patient underwent a verification therapeutic radiology simulation-aided field setting defining relevant normal and abnormal target anatomy and acquiring images with a separate and distinct diagnostic high-frequency ultrasound to delineate tissues and determine whether to proceed with delivery of therapeutic, in addition to retrieve data necessary to develop an optimal radiation treatment process for the patient. The field placement simulation documents any change seen in overall tumor volume documented in the patient’s record, allows the clinician to indicate any needed changes in the treatment plan and/or prescription, provides diagnostic evaluation as the basis for performing the therapeutic procedure, and clearly identifies the information needed to decide to proceed with the therapeutic procedure. This process includes verification of the treatment port(s) and proper treatment positioning. All treatment ports were photographed within electronic medical records. The patient's lead blocking along with gross tumor volume and margin was confirmed. Considering superficial radiotherapy is clinical in setup, this requires the physician and radiation therapist to clarify the location interest being treated against initial images, ultrasound, pathology, and patient anatomy. Care was taken to ensure laboy treated were geometrically accurate and properly positioned using therapeutic radiology simulation-aided field setting verification per fraction. This process is also utilized to determine if any prescription or setup changes are necessary. These steps are therefore medically necessary to ensure safe and effective administration of radiation. Ongoing therapeutic radiology simulation-aided field setting verification is ordered throughout the course of therapy.  \\n\\nA high-frequency ultrasound image was acquired today for a two-dimensional evaluation of the tumor volume, depth, width, breadth, review of prior response to treatment, provide geometric accuracy of field placement, and determine whether to proceed with therapeutic delivery.\\n\\nThe field placement and ultrasound imaging, per fraction, is separate and distinct from the initial simulation and is an important task in providing safe administration of superficial radiation therapy. Physician evaluation of the ultrasound information will be ongoing throughout the course of treatment and is deemed medically necessary to ensure the efficacy of treatment, whether to proceed with therapeutic delivery, and determine any necessary changes. Today's images were evaluated for determination of continuation of treatment with the current plan or with necessary changes as appropriate. Additionally, the use of ultrasound visualization and targeted assessment allows the patient to be able to see his or her cancer(s) progress, encouraging the patient to complete and maintain compliance through the full course of prescribed radiotherapy. Per Dr. Ospina, continued ultrasound guidance and therapeutic radiology simulation-aided field setting verification per fraction is required for field placement, measurement of tumor depth, tissues evaluation, progress, acute effect monitoring, and determination for therapeutic treatment delivery is appropriate.
Additional Comments (Add Customization Of Note Here): The patient states that the scab fell off yesterday at the biopsy site when applying Vaseline.
Ultrasound Used Text: High frequency ultrasound depth is 2.04 mm, which is 0.26 mm in difference from previous imaging.

## 2023-09-13 ENCOUNTER — APPOINTMENT (OUTPATIENT)
Dept: URBAN - METROPOLITAN AREA CLINIC 259 | Age: 83
Setting detail: DERMATOLOGY
End: 2023-09-14

## 2023-09-13 PROBLEM — C44.319 BASAL CELL CARCINOMA OF SKIN OF OTHER PARTS OF FACE: Status: ACTIVE | Noted: 2023-09-13

## 2023-09-13 PROBLEM — C44.399 OTHER SPECIFIED MALIGNANT NEOPLASM OF SKIN OF OTHER PARTS OF FACE: Status: ACTIVE | Noted: 2023-09-13

## 2023-09-13 PROBLEM — C44.01 BASAL CELL CARCINOMA OF SKIN OF LIP: Status: ACTIVE | Noted: 2023-09-13

## 2023-09-13 PROBLEM — C44.311 BASAL CELL CARCINOMA OF SKIN OF NOSE: Status: ACTIVE | Noted: 2023-09-13

## 2023-09-13 PROCEDURE — OTHER IMAGE GUIDED - SUPERFICIAL RADIOTHERAPY: EVALUATION VISIT: OTHER

## 2023-09-13 PROCEDURE — OTHER IMAGE GUIDED - SUPERFICIAL RADIOTHERAPY: OTHER

## 2023-09-13 PROCEDURE — G6001 ECHO GUIDANCE RADIOTHERAPY: HCPCS | Mod: XU

## 2023-09-13 PROCEDURE — 77401 RADIATION TX DELIVERY SUPFC: CPT

## 2023-09-13 PROCEDURE — 77427 RADIATION TX MANAGEMENT X5: CPT

## 2023-09-13 PROCEDURE — 77280 THER RAD SIMULAJ FIELD SMPL: CPT

## 2023-09-13 PROCEDURE — OTHER IMAGE GUIDED - SUPERFICIAL RADIOTHERAPY: TREATMENT VISIT: OTHER

## 2023-09-13 NOTE — PROCEDURE: IMAGE GUIDED - SUPERFICIAL RADIOTHERAPY: TREATMENT VISIT
Calculate Total Cumulative Dose Automatically Or Manually: Manually
Show Ultrasound In Note?: Yes
Total Cumulative Dose (Cgy): 2421.90
Additional Change To Daily Dosage Administered Mid Treatment?: No
Add X Modifier?: JURADO - Unusual Non-Overlapping Service
Ultrasound Used Text: High frequency ultrasound depth is 1.78 mm, which is 0.55 mm in difference from previous imaging.
Prescription Used: 1
Ultrasound Not Used Text: Ultrasound was not performed today due to
Fraction Number: 9
Treatment Documentation: This patient has been treated today with image-guided superficial radiation therapy for non-melanoma skin cancer. Written informed consent has been previously obtained from this patient for this treatment. This consent is documented in the patient's chart. The patient gave verbal consent to continue treatment today. The patient was treated with a specific radiation dose and setup as prescribed by the provider listed on this visit note.  A Radiation Therapist performed administration of radiation under the supervision of a provider. The treatment parameters and cumulative dose are indicated above. Prior to administering the radiation, the patient underwent a verification therapeutic radiology simulation-aided field setting defining relevant normal and abnormal target anatomy and acquiring images with a separate and distinct diagnostic high-frequency ultrasound to delineate tissues and determine whether to proceed with delivery of therapeutic, in addition to retrieve data necessary to develop an optimal radiation treatment process for the patient. The field placement simulation documents any change seen in overall tumor volume documented in the patient’s record, allows the clinician to indicate any needed changes in the treatment plan and/or prescription, provides diagnostic evaluation as the basis for performing the therapeutic procedure, and clearly identifies the information needed to decide to proceed with the therapeutic procedure. This process includes verification of the treatment port(s) and proper treatment positioning. All treatment ports were photographed within electronic medical records. The patient's lead blocking along with gross tumor volume and margin was confirmed. Considering superficial radiotherapy is clinical in setup, this requires the physician and radiation therapist to clarify the location interest being treated against initial images, ultrasound, pathology, and patient anatomy. Care was taken to ensure laboy treated were geometrically accurate and properly positioned using therapeutic radiology simulation-aided field setting verification per fraction. This process is also utilized to determine if any prescription or setup changes are necessary. These steps are therefore medically necessary to ensure safe and effective administration of radiation. Ongoing therapeutic radiology simulation-aided field setting verification is ordered throughout the course of therapy.  \\n\\nA high-frequency ultrasound image was acquired today for a two-dimensional evaluation of the tumor volume, depth, width, breadth, review of prior response to treatment, provide geometric accuracy of field placement, and determine whether to proceed with therapeutic delivery.\\n\\nThe field placement and ultrasound imaging, per fraction, is separate and distinct from the initial simulation and is an important task in providing safe administration of superficial radiation therapy. Physician evaluation of the ultrasound information will be ongoing throughout the course of treatment and is deemed medically necessary to ensure the efficacy of treatment, whether to proceed with therapeutic delivery, and determine any necessary changes. Today's images were evaluated for determination of continuation of treatment with the current plan or with necessary changes as appropriate. Additionally, the use of ultrasound visualization and targeted assessment allows the patient to be able to see his or her cancer(s) progress, encouraging the patient to complete and maintain compliance through the full course of prescribed radiotherapy. Per Dr. Ospina, continued ultrasound guidance and therapeutic radiology simulation-aided field setting verification per fraction is required for field placement, measurement of tumor depth, tissues evaluation, progress, acute effect monitoring, and determination for therapeutic treatment delivery is appropriate.
Energy (Kv): 100
Daily Dosage (Cgy): 269.10
Additional Comments (Add Customization Of Note Here): The patient's skin was evaluated and all side effects look appropriate for being on fraction number 9 today.
Treatment Documentation: This patient has been treated today with image-guided superficial radiation therapy for non-melanoma skin cancer. Written informed consent has been previously obtained from this patient for this treatment. This consent is documented in the patient's chart. The patient gave verbal consent to continue treatment today. The patient was treated with a specific radiation dose and setup as prescribed by the provider listed on this visit note.  A Radiation Therapist performed administration of radiation under the supervision of a provider. The treatment parameters and cumulative dose are indicated above. Prior to administering the radiation, the patient underwent a verification therapeutic radiology simulation-aided field setting defining relevant normal and abnormal target anatomy and acquiring images with a separate and distinct diagnostic high-frequency ultrasound to delineate tissues and determine whether to proceed with delivery of therapeutic, in addition to retrieve data necessary to develop an optimal radiation treatment process for the patient. The field placement simulation documents any change seen in overall tumor volume documented in the patient’s record, allows the clinician to indicate any needed changes in the treatment plan and/or prescription, provides diagnostic evaluation as the basis for performing the therapeutic procedure, and clearly identifies the information needed to decide to proceed with the therapeutic procedure. This process includes verification of the treatment port(s) and proper treatment positioning. All treatment ports were photographed within electronic medical records. The patient's lead blocking along with gross tumor volume and margin was confirmed. Considering superficial radiotherapy is clinical in setup, this requires the physician and radiation therapist to clarify the location interest being treated against initial images, ultrasound, pathology, and patient anatomy. Care was taken to ensure laboy treated were geometrically accurate and properly positioned using therapeutic radiology simulation-aided field setting verification per fraction. This process is also utilized to determine if any prescription or setup changes are necessary. These steps are therefore medically necessary to ensure safe and effective administration of radiation. Ongoing therapeutic radiology simulation-aided field setting verification is ordered throughout the course of therapy.  \\n\\nA high-frequency ultrasound image was acquired today for a two-dimensional evaluation of the tumor volume, depth, width, breadth, review of prior response to treatment, provide geometric accuracy of field placement, and determine whether to proceed with therapeutic delivery.\\n\\nThe field placement and ultrasound imaging, per fraction, is separate and distinct from the initial simulation and is an important task in providing safe administration of superficial radiation therapy. Physician evaluation of the ultrasound information will be ongoing throughout the course of treatment and is deemed medically necessary to ensure the efficacy of treatment, whether to proceed with therapeutic delivery, and determine any necessary changes. Today's images were evaluated for determination of continuation of treatment with the current plan or with necessary changes as appropriate. Additionally, the use of ultrasound visualization and targeted assessment allows the patient to be able to see his or her cancer(s) progress, encouraging the patient to complete and maintain compliance through the full course of prescribed radiotherapy. Per Dr. Ospina, continued ultrasound guidance and therapeutic radiology simulation-aided field setting verification per fraction is required for field placement, measurement of tumor depth, tissues evaluation, progress, acute effect monitoring, and determination for therapeutic treatment delivery is appropriate.
Additional Comments (Add Customization Of Note Here): The patient's skin was evaluated and all side effects look appropriate for being on fraction number 9.
Daily Dosage (Cgy): 267.46
Total Cumulative Dose (Cgy): 2407.14
Ultrasound Used Text: High frequency ultrasound depth is 0.92 mm, which is 0.04 mm in difference from previous imaging.
Daily Dosage (Cgy): 264.04
Energy (Kv): 70
Total Cumulative Dose (Cgy): 2376.36
Ultrasound Used Text: High frequency ultrasound depth is 0.88 mm, which is 0.02 mm in difference from previous imaging.
Treatment Documentation: This patient has been treated today with image-guided superficial radiation therapy for non-melanoma skin cancer. Written informed consent has been previously obtained from this patient for this treatment. This consent is documented in the patient's chart. The patient gave verbal consent to continue treatment today. The patient was treated with a specific radiation dose and setup as prescribed by the provider listed on this visit note.  A Radiation Therapist performed administration of radiation under the supervision of a provider. The treatment parameters and cumulative dose are indicated above. Prior to administering the radiation, the patient underwent a verification therapeutic radiology simulation-aided field setting defining relevant normal and abnormal target anatomy and acquiring images with a separate and distinct diagnostic high-frequency ultrasound to delineate tissues and determine whether to proceed with delivery of therapeutic, in addition to retrieve data necessary to develop an optimal radiation treatment process for the patient. The field placement simulation documents any change seen in overall tumor volume documented in the patient’s record, allows the clinician to indicate any needed changes in the treatment plan and/or prescription, provides diagnostic evaluation as the basis for performing the therapeutic procedure, and clearly identifies the information needed to decide to proceed with the therapeutic procedure. This process includes verification of the treatment port(s) and proper treatment positioning. All treatment ports were photographed within electronic medical records. The patient's lead blocking along with gross tumor volume and margin was confirmed. Considering superficial radiotherapy is clinical in setup, this requires the physician and radiation therapist to clarify the location interest being treated against initial images, ultrasound, pathology, and patient anatomy. Care was taken to ensure laboy treated were geometrically accurate and properly positioned using therapeutic radiology simulation-aided field setting verification per fraction. This process is also utilized to determine if any prescription or setup changes are necessary. These steps are therefore medically necessary to ensure safe and effective administration of radiation. Ongoing therapeutic radiology simulation-aided field setting verification is ordered throughout the course of therapy.  \\n\\nA high-frequency ultrasound image was acquired today for a two-dimensional evaluation of the tumor volume, depth, width, breadth, review of prior response to treatment, provide geometric accuracy of field placement, and determine whether to proceed with therapeutic delivery.\\n\\nThe field placement and ultrasound imaging, per fraction, is separate and distinct from the initial simulation and is an important task in providing safe administration of superficial radiation therapy. Physician evaluation of the ultrasound information will be ongoing throughout the course of treatment and is deemed medically necessary to ensure the efficacy of treatment, whether to proceed with therapeutic delivery, and determine any necessary changes. Today's images were evaluated for determination of continuation of treatment with the current plan or with necessary changes as appropriate. Additionally, the use of ultrasound visualization and targeted assessment allows the patient to be able to see his or her cancer(s) progress, encouraging the patient to complete and maintain compliance through the full course of prescribed radiotherapy. Per Dr. Ospina, continued ultrasound guidance and therapeutic radiology simulation-aided field setting verification per fraction is required for field placement, measurement of tumor depth, tissues evaluation, progress, acute effect monitoring, and determination for therapeutic treatment delivery is appropriate.
Treatment Documentation: This patient has been treated today with image-guided superficial radiation therapy for non-melanoma skin cancer. Written informed consent has been previously obtained from this patient for this treatment. This consent is documented in the patient's chart. The patient gave verbal consent to continue treatment today. The patient was treated with a specific radiation dose and setup as prescribed by the provider listed on this visit note.  A Radiation Therapist performed administration of radiation under the supervision of a provider. The treatment parameters and cumulative dose are indicated above. Prior to administering the radiation, the patient underwent a verification therapeutic radiology simulation-aided field setting defining relevant normal and abnormal target anatomy and acquiring images with a separate and distinct diagnostic high-frequency ultrasound to delineate tissues and determine whether to proceed with delivery of therapeutic, in addition to retrieve data necessary to develop an optimal radiation treatment process for the patient. The field placement simulation documents any change seen in overall tumor volume documented in the patient’s record, allows the clinician to indicate any needed changes in the treatment plan and/or prescription, provides diagnostic evaluation as the basis for performing the therapeutic procedure, and clearly identifies the information needed to decide to proceed with the therapeutic procedure. This process includes verification of the treatment port(s) and proper treatment positioning. All treatment ports were photographed within electronic medical records. The patient's lead blocking along with gross tumor volume and margin was confirmed. Considering superficial radiotherapy is clinical in setup, this requires the physician and radiation therapist to clarify the location interest being treated against initial images, ultrasound, pathology, and patient anatomy. Care was taken to ensure laboy treated were geometrically accurate and properly positioned using therapeutic radiology simulation-aided field setting verification per fraction. This process is also utilized to determine if any prescription or setup changes are necessary. These steps are therefore medically necessary to ensure safe and effective administration of radiation. Ongoing therapeutic radiology simulation-aided field setting verification is ordered throughout the course of therapy.  \\n\\nA high-frequency ultrasound image was acquired today for a two-dimensional evaluation of the tumor volume, depth, width, breadth, review of prior response to treatment, provide geometric accuracy of field placement, and determine whether to proceed with therapeutic delivery.\\n\\nThe field placement and ultrasound imaging, per fraction, is separate and distinct from the initial simulation and is an important task in providing safe administration of superficial radiation therapy. Physician evaluation of the ultrasound information will be ongoing throughout the course of treatment and is deemed medically necessary to ensure the efficacy of treatment, whether to proceed with therapeutic delivery, and determine any necessary changes. Today's images were evaluated for determination of continuation of treatment with the current plan or with necessary changes as appropriate. Additionally, the use of ultrasound visualization and targeted assessment allows the patient to be able to see his or her cancer(s) progress, encouraging the patient to complete and maintain compliance through the full course of prescribed radiotherapy. Per Dr. Ospina, continued ultrasound guidance and therapeutic radiology simulation-aided field setting verification per fraction is required for field placement, measurement of tumor depth, tissues evaluation, progress, acute effect monitoring, and determination for therapeutic treatment delivery is appropriate.
Ultrasound Used Text: High frequency ultrasound depth is 1.97 mm, which is 0.07 mm in difference from previous imaging.

## 2023-09-13 NOTE — PROCEDURE: IMAGE GUIDED - SUPERFICIAL RADIOTHERAPY: EVALUATION VISIT
Was Ultrasound Performed Today?: No
Ultrasound Used Text: Ultrasound depth is mm.
Assessment: Appropriate reaction
Evaluation Plan: The patient is undergoing superficial radiation therapy for skin cancer and presents for weekly evaluation and management. Per protocol and as documented on the flow sheet, the patient was questioned as to subjective redness, pruritus, pain, drainage, fatigue, or any other symptoms. Objectively, the radiation area was evaluated with regards to erythema, atrophy, scale, crusting, erosion, ulceration, edema, purpura, tenderness, warmth, drainage, and any other findings. The plan was extensively reviewed including dose and dosing schedule. The simulation and clinical setup were also reviewed as were external and any internal shields and based on this review the appropriateness and sufficiency of treatment was determined.
Is This Visit For Evaluation During Treatment Or Follow Up Post Treatment?: evaluation
Bill For Evaluation (31213)?: Yes
Radiation Therapy Oncology Group (Rtog) Score: 1
Ultrasound Not Used Text: Ultrasound was not performed today due to

## 2023-09-14 ENCOUNTER — APPOINTMENT (OUTPATIENT)
Dept: URBAN - METROPOLITAN AREA CLINIC 259 | Age: 83
Setting detail: DERMATOLOGY
End: 2023-09-18

## 2023-09-14 PROBLEM — C44.311 BASAL CELL CARCINOMA OF SKIN OF NOSE: Status: ACTIVE | Noted: 2023-09-14

## 2023-09-14 PROBLEM — C44.01 BASAL CELL CARCINOMA OF SKIN OF LIP: Status: ACTIVE | Noted: 2023-09-14

## 2023-09-14 PROBLEM — C44.319 BASAL CELL CARCINOMA OF SKIN OF OTHER PARTS OF FACE: Status: ACTIVE | Noted: 2023-09-14

## 2023-09-14 PROBLEM — C44.399 OTHER SPECIFIED MALIGNANT NEOPLASM OF SKIN OF OTHER PARTS OF FACE: Status: ACTIVE | Noted: 2023-09-14

## 2023-09-14 PROCEDURE — OTHER IMAGE GUIDED - SUPERFICIAL RADIOTHERAPY: OTHER

## 2023-09-14 PROCEDURE — 77280 THER RAD SIMULAJ FIELD SMPL: CPT

## 2023-09-14 PROCEDURE — OTHER IMAGE GUIDED - SUPERFICIAL RADIOTHERAPY: TREATMENT VISIT: OTHER

## 2023-09-14 PROCEDURE — G6001 ECHO GUIDANCE RADIOTHERAPY: HCPCS | Mod: XU

## 2023-09-14 PROCEDURE — 77401 RADIATION TX DELIVERY SUPFC: CPT

## 2023-09-14 NOTE — PROCEDURE: IMAGE GUIDED - SUPERFICIAL RADIOTHERAPY: TREATMENT VISIT
Calculate Total Cumulative Dose Automatically Or Manually: Manually
Show Ultrasound In Note?: Yes
Total Cumulative Dose (Cgy): 2691.00
Additional Change To Daily Dosage Administered Mid Treatment?: No
Add X Modifier?: JURADO - Unusual Non-Overlapping Service
Ultrasound Used Text: High frequency ultrasound depth is 1.81 mm, which is 0.03 mm in difference from previous imaging.
Prescription Used: 1
Ultrasound Not Used Text: Ultrasound was not performed today due to
Fraction Number: 10
Treatment Documentation: This patient has been treated today with image-guided superficial radiation therapy for non-melanoma skin cancer. Written informed consent has been previously obtained from this patient for this treatment. This consent is documented in the patient's chart. The patient gave verbal consent to continue treatment today. The patient was treated with a specific radiation dose and setup as prescribed by the provider listed on this visit note.  A Radiation Therapist performed administration of radiation under the supervision of a provider. The treatment parameters and cumulative dose are indicated above. Prior to administering the radiation, the patient underwent a verification therapeutic radiology simulation-aided field setting defining relevant normal and abnormal target anatomy and acquiring images with a separate and distinct diagnostic high-frequency ultrasound to delineate tissues and determine whether to proceed with delivery of therapeutic, in addition to retrieve data necessary to develop an optimal radiation treatment process for the patient. The field placement simulation documents any change seen in overall tumor volume documented in the patient’s record, allows the clinician to indicate any needed changes in the treatment plan and/or prescription, provides diagnostic evaluation as the basis for performing the therapeutic procedure, and clearly identifies the information needed to decide to proceed with the therapeutic procedure. This process includes verification of the treatment port(s) and proper treatment positioning. All treatment ports were photographed within electronic medical records. The patient's lead blocking along with gross tumor volume and margin was confirmed. Considering superficial radiotherapy is clinical in setup, this requires the physician and radiation therapist to clarify the location interest being treated against initial images, ultrasound, pathology, and patient anatomy. Care was taken to ensure laboy treated were geometrically accurate and properly positioned using therapeutic radiology simulation-aided field setting verification per fraction. This process is also utilized to determine if any prescription or setup changes are necessary. These steps are therefore medically necessary to ensure safe and effective administration of radiation. Ongoing therapeutic radiology simulation-aided field setting verification is ordered throughout the course of therapy.  \\n\\nA high-frequency ultrasound image was acquired today for a two-dimensional evaluation of the tumor volume, depth, width, breadth, review of prior response to treatment, provide geometric accuracy of field placement, and determine whether to proceed with therapeutic delivery.\\n\\nThe field placement and ultrasound imaging, per fraction, is separate and distinct from the initial simulation and is an important task in providing safe administration of superficial radiation therapy. Physician evaluation of the ultrasound information will be ongoing throughout the course of treatment and is deemed medically necessary to ensure the efficacy of treatment, whether to proceed with therapeutic delivery, and determine any necessary changes. Today's images were evaluated for determination of continuation of treatment with the current plan or with necessary changes as appropriate. Additionally, the use of ultrasound visualization and targeted assessment allows the patient to be able to see his or her cancer(s) progress, encouraging the patient to complete and maintain compliance through the full course of prescribed radiotherapy. Per Dr. Ospina, continued ultrasound guidance and therapeutic radiology simulation-aided field setting verification per fraction is required for field placement, measurement of tumor depth, tissues evaluation, progress, acute effect monitoring, and determination for therapeutic treatment delivery is appropriate.
Energy (Kv): 100
Daily Dosage (Cgy): 269.10
Additional Comments (Add Customization Of Note Here): The patient's ultrasounds continue to improve over the course of treatment.
Treatment Documentation: This patient has been treated today with image-guided superficial radiation therapy for non-melanoma skin cancer. Written informed consent has been previously obtained from this patient for this treatment. This consent is documented in the patient's chart. The patient gave verbal consent to continue treatment today. The patient was treated with a specific radiation dose and setup as prescribed by the provider listed on this visit note.  A Radiation Therapist performed administration of radiation under the supervision of a provider. The treatment parameters and cumulative dose are indicated above. Prior to administering the radiation, the patient underwent a verification therapeutic radiology simulation-aided field setting defining relevant normal and abnormal target anatomy and acquiring images with a separate and distinct diagnostic high-frequency ultrasound to delineate tissues and determine whether to proceed with delivery of therapeutic, in addition to retrieve data necessary to develop an optimal radiation treatment process for the patient. The field placement simulation documents any change seen in overall tumor volume documented in the patient’s record, allows the clinician to indicate any needed changes in the treatment plan and/or prescription, provides diagnostic evaluation as the basis for performing the therapeutic procedure, and clearly identifies the information needed to decide to proceed with the therapeutic procedure. This process includes verification of the treatment port(s) and proper treatment positioning. All treatment ports were photographed within electronic medical records. The patient's lead blocking along with gross tumor volume and margin was confirmed. Considering superficial radiotherapy is clinical in setup, this requires the physician and radiation therapist to clarify the location interest being treated against initial images, ultrasound, pathology, and patient anatomy. Care was taken to ensure laboy treated were geometrically accurate and properly positioned using therapeutic radiology simulation-aided field setting verification per fraction. This process is also utilized to determine if any prescription or setup changes are necessary. These steps are therefore medically necessary to ensure safe and effective administration of radiation. Ongoing therapeutic radiology simulation-aided field setting verification is ordered throughout the course of therapy.  \\n\\nA high-frequency ultrasound image was acquired today for a two-dimensional evaluation of the tumor volume, depth, width, breadth, review of prior response to treatment, provide geometric accuracy of field placement, and determine whether to proceed with therapeutic delivery.\\n\\nThe field placement and ultrasound imaging, per fraction, is separate and distinct from the initial simulation and is an important task in providing safe administration of superficial radiation therapy. Physician evaluation of the ultrasound information will be ongoing throughout the course of treatment and is deemed medically necessary to ensure the efficacy of treatment, whether to proceed with therapeutic delivery, and determine any necessary changes. Today's images were evaluated for determination of continuation of treatment with the current plan or with necessary changes as appropriate. Additionally, the use of ultrasound visualization and targeted assessment allows the patient to be able to see his or her cancer(s) progress, encouraging the patient to complete and maintain compliance through the full course of prescribed radiotherapy. Per Dr. Ospina, continued ultrasound guidance and therapeutic radiology simulation-aided field setting verification per fraction is required for field placement, measurement of tumor depth, tissues evaluation, progress, acute effect monitoring, and determination for therapeutic treatment delivery is appropriate.
Daily Dosage (Cgy): 267.46
Total Cumulative Dose (Cgy): 2674.60
Ultrasound Used Text: High frequency ultrasound depth is 1.05 mm, which is 0.13 mm in difference from previous imaging.
Daily Dosage (Cgy): 264.04
Energy (Kv): 70
Total Cumulative Dose (Cgy): 2640.40
Ultrasound Used Text: High frequency ultrasound depth is 0.90 mm, which is 0.02 mm in difference from previous imaging.
Treatment Documentation: This patient has been treated today with image-guided superficial radiation therapy for non-melanoma skin cancer. Written informed consent has been previously obtained from this patient for this treatment. This consent is documented in the patient's chart. The patient gave verbal consent to continue treatment today. The patient was treated with a specific radiation dose and setup as prescribed by the provider listed on this visit note.  A Radiation Therapist performed administration of radiation under the supervision of a provider. The treatment parameters and cumulative dose are indicated above. Prior to administering the radiation, the patient underwent a verification therapeutic radiology simulation-aided field setting defining relevant normal and abnormal target anatomy and acquiring images with a separate and distinct diagnostic high-frequency ultrasound to delineate tissues and determine whether to proceed with delivery of therapeutic, in addition to retrieve data necessary to develop an optimal radiation treatment process for the patient. The field placement simulation documents any change seen in overall tumor volume documented in the patient’s record, allows the clinician to indicate any needed changes in the treatment plan and/or prescription, provides diagnostic evaluation as the basis for performing the therapeutic procedure, and clearly identifies the information needed to decide to proceed with the therapeutic procedure. This process includes verification of the treatment port(s) and proper treatment positioning. All treatment ports were photographed within electronic medical records. The patient's lead blocking along with gross tumor volume and margin was confirmed. Considering superficial radiotherapy is clinical in setup, this requires the physician and radiation therapist to clarify the location interest being treated against initial images, ultrasound, pathology, and patient anatomy. Care was taken to ensure laboy treated were geometrically accurate and properly positioned using therapeutic radiology simulation-aided field setting verification per fraction. This process is also utilized to determine if any prescription or setup changes are necessary. These steps are therefore medically necessary to ensure safe and effective administration of radiation. Ongoing therapeutic radiology simulation-aided field setting verification is ordered throughout the course of therapy.  \\n\\nA high-frequency ultrasound image was acquired today for a two-dimensional evaluation of the tumor volume, depth, width, breadth, review of prior response to treatment, provide geometric accuracy of field placement, and determine whether to proceed with therapeutic delivery.\\n\\nThe field placement and ultrasound imaging, per fraction, is separate and distinct from the initial simulation and is an important task in providing safe administration of superficial radiation therapy. Physician evaluation of the ultrasound information will be ongoing throughout the course of treatment and is deemed medically necessary to ensure the efficacy of treatment, whether to proceed with therapeutic delivery, and determine any necessary changes. Today's images were evaluated for determination of continuation of treatment with the current plan or with necessary changes as appropriate. Additionally, the use of ultrasound visualization and targeted assessment allows the patient to be able to see his or her cancer(s) progress, encouraging the patient to complete and maintain compliance through the full course of prescribed radiotherapy. Per Dr. Ospina, continued ultrasound guidance and therapeutic radiology simulation-aided field setting verification per fraction is required for field placement, measurement of tumor depth, tissues evaluation, progress, acute effect monitoring, and determination for therapeutic treatment delivery is appropriate.
Ultrasound Used Text: High frequency ultrasound depth is 1.61 mm, which is 0.36 mm in difference from previous imaging.

## 2023-09-18 ENCOUNTER — APPOINTMENT (OUTPATIENT)
Dept: URBAN - METROPOLITAN AREA CLINIC 259 | Age: 83
Setting detail: DERMATOLOGY
End: 2023-09-22

## 2023-09-18 PROBLEM — C44.311 BASAL CELL CARCINOMA OF SKIN OF NOSE: Status: ACTIVE | Noted: 2023-09-18

## 2023-09-18 PROBLEM — C44.399 OTHER SPECIFIED MALIGNANT NEOPLASM OF SKIN OF OTHER PARTS OF FACE: Status: ACTIVE | Noted: 2023-09-18

## 2023-09-18 PROBLEM — C44.01 BASAL CELL CARCINOMA OF SKIN OF LIP: Status: ACTIVE | Noted: 2023-09-18

## 2023-09-18 PROBLEM — C44.319 BASAL CELL CARCINOMA OF SKIN OF OTHER PARTS OF FACE: Status: ACTIVE | Noted: 2023-09-18

## 2023-09-18 PROCEDURE — 77280 THER RAD SIMULAJ FIELD SMPL: CPT

## 2023-09-18 PROCEDURE — OTHER IMAGE GUIDED - SUPERFICIAL RADIOTHERAPY: OTHER

## 2023-09-18 PROCEDURE — G6001 ECHO GUIDANCE RADIOTHERAPY: HCPCS | Mod: XU

## 2023-09-18 PROCEDURE — OTHER IMAGE GUIDED - SUPERFICIAL RADIOTHERAPY: TREATMENT VISIT: OTHER

## 2023-09-18 PROCEDURE — 77401 RADIATION TX DELIVERY SUPFC: CPT

## 2023-09-18 NOTE — PROCEDURE: IMAGE GUIDED - SUPERFICIAL RADIOTHERAPY: TREATMENT VISIT
Calculate Total Cumulative Dose Automatically Or Manually: Manually
Show Ultrasound In Note?: Yes
Total Cumulative Dose (Cgy): 2960.10
Additional Change To Daily Dosage Administered Mid Treatment?: No
Add X Modifier?: JURADO - Unusual Non-Overlapping Service
Ultrasound Used Text: High frequency ultrasound depth is 1.72 mm, which is 0.09 mm in difference from previous imaging.
Prescription Used: 1
Ultrasound Not Used Text: Ultrasound was not performed today due to
Fraction Number: 11
Treatment Documentation: This patient has been treated today with image-guided superficial radiation therapy for non-melanoma skin cancer. Written informed consent has been previously obtained from this patient for this treatment. This consent is documented in the patient's chart. The patient gave verbal consent to continue treatment today. The patient was treated with a specific radiation dose and setup as prescribed by the provider listed on this visit note.  A Radiation Therapist performed administration of radiation under the supervision of a provider. The treatment parameters and cumulative dose are indicated above. Prior to administering the radiation, the patient underwent a verification therapeutic radiology simulation-aided field setting defining relevant normal and abnormal target anatomy and acquiring images with a separate and distinct diagnostic high-frequency ultrasound to delineate tissues and determine whether to proceed with delivery of therapeutic, in addition to retrieve data necessary to develop an optimal radiation treatment process for the patient. The field placement simulation documents any change seen in overall tumor volume documented in the patient’s record, allows the clinician to indicate any needed changes in the treatment plan and/or prescription, provides diagnostic evaluation as the basis for performing the therapeutic procedure, and clearly identifies the information needed to decide to proceed with the therapeutic procedure. This process includes verification of the treatment port(s) and proper treatment positioning. All treatment ports were photographed within electronic medical records. The patient's lead blocking along with gross tumor volume and margin was confirmed. Considering superficial radiotherapy is clinical in setup, this requires the physician and radiation therapist to clarify the location interest being treated against initial images, ultrasound, pathology, and patient anatomy. Care was taken to ensure laboy treated were geometrically accurate and properly positioned using therapeutic radiology simulation-aided field setting verification per fraction. This process is also utilized to determine if any prescription or setup changes are necessary. These steps are therefore medically necessary to ensure safe and effective administration of radiation. Ongoing therapeutic radiology simulation-aided field setting verification is ordered throughout the course of therapy.  \\n\\nA high-frequency ultrasound image was acquired today for a two-dimensional evaluation of the tumor volume, depth, width, breadth, review of prior response to treatment, provide geometric accuracy of field placement, and determine whether to proceed with therapeutic delivery.\\n\\nThe field placement and ultrasound imaging, per fraction, is separate and distinct from the initial simulation and is an important task in providing safe administration of superficial radiation therapy. Physician evaluation of the ultrasound information will be ongoing throughout the course of treatment and is deemed medically necessary to ensure the efficacy of treatment, whether to proceed with therapeutic delivery, and determine any necessary changes. Today's images were evaluated for determination of continuation of treatment with the current plan or with necessary changes as appropriate. Additionally, the use of ultrasound visualization and targeted assessment allows the patient to be able to see his or her cancer(s) progress, encouraging the patient to complete and maintain compliance through the full course of prescribed radiotherapy. Per Dr. Ospina, continued ultrasound guidance and therapeutic radiology simulation-aided field setting verification per fraction is required for field placement, measurement of tumor depth, tissues evaluation, progress, acute effect monitoring, and determination for therapeutic treatment delivery is appropriate.
Energy (Kv): 100
Daily Dosage (Cgy): 269.10
Additional Comments (Add Customization Of Note Here): The patient states he is using Vaseline daily on the treatment field.
Treatment Documentation: This patient has been treated today with image-guided superficial radiation therapy for non-melanoma skin cancer. Written informed consent has been previously obtained from this patient for this treatment. This consent is documented in the patient's chart. The patient gave verbal consent to continue treatment today. The patient was treated with a specific radiation dose and setup as prescribed by the provider listed on this visit note.  A Radiation Therapist performed administration of radiation under the supervision of a provider. The treatment parameters and cumulative dose are indicated above. Prior to administering the radiation, the patient underwent a verification therapeutic radiology simulation-aided field setting defining relevant normal and abnormal target anatomy and acquiring images with a separate and distinct diagnostic high-frequency ultrasound to delineate tissues and determine whether to proceed with delivery of therapeutic, in addition to retrieve data necessary to develop an optimal radiation treatment process for the patient. The field placement simulation documents any change seen in overall tumor volume documented in the patient’s record, allows the clinician to indicate any needed changes in the treatment plan and/or prescription, provides diagnostic evaluation as the basis for performing the therapeutic procedure, and clearly identifies the information needed to decide to proceed with the therapeutic procedure. This process includes verification of the treatment port(s) and proper treatment positioning. All treatment ports were photographed within electronic medical records. The patient's lead blocking along with gross tumor volume and margin was confirmed. Considering superficial radiotherapy is clinical in setup, this requires the physician and radiation therapist to clarify the location interest being treated against initial images, ultrasound, pathology, and patient anatomy. Care was taken to ensure laboy treated were geometrically accurate and properly positioned using therapeutic radiology simulation-aided field setting verification per fraction. This process is also utilized to determine if any prescription or setup changes are necessary. These steps are therefore medically necessary to ensure safe and effective administration of radiation. Ongoing therapeutic radiology simulation-aided field setting verification is ordered throughout the course of therapy.  \\n\\nA high-frequency ultrasound image was acquired today for a two-dimensional evaluation of the tumor volume, depth, width, breadth, review of prior response to treatment, provide geometric accuracy of field placement, and determine whether to proceed with therapeutic delivery.\\n\\nThe field placement and ultrasound imaging, per fraction, is separate and distinct from the initial simulation and is an important task in providing safe administration of superficial radiation therapy. Physician evaluation of the ultrasound information will be ongoing throughout the course of treatment and is deemed medically necessary to ensure the efficacy of treatment, whether to proceed with therapeutic delivery, and determine any necessary changes. Today's images were evaluated for determination of continuation of treatment with the current plan or with necessary changes as appropriate. Additionally, the use of ultrasound visualization and targeted assessment allows the patient to be able to see his or her cancer(s) progress, encouraging the patient to complete and maintain compliance through the full course of prescribed radiotherapy. Per Dr. Ospina, continued ultrasound guidance and therapeutic radiology simulation-aided field setting verification per fraction is required for field placement, measurement of tumor depth, tissues evaluation, progress, acute effect monitoring, and determination for therapeutic treatment delivery is appropriate.
Daily Dosage (Cgy): 267.46
Total Cumulative Dose (Cgy): 2942.06
Ultrasound Used Text: High frequency ultrasound depth is 1.05 mm, which is 0.00 mm in difference from previous imaging.
Daily Dosage (Cgy): 264.04
Energy (Kv): 70
Total Cumulative Dose (Cgy): 2904.44
Ultrasound Used Text: High frequency ultrasound depth is 1.23 mm, which is 0.33 mm in difference from previous imaging.
Treatment Documentation: This patient has been treated today with image-guided superficial radiation therapy for non-melanoma skin cancer. Written informed consent has been previously obtained from this patient for this treatment. This consent is documented in the patient's chart. The patient gave verbal consent to continue treatment today. The patient was treated with a specific radiation dose and setup as prescribed by the provider listed on this visit note.  A Radiation Therapist performed administration of radiation under the supervision of a provider. The treatment parameters and cumulative dose are indicated above. Prior to administering the radiation, the patient underwent a verification therapeutic radiology simulation-aided field setting defining relevant normal and abnormal target anatomy and acquiring images with a separate and distinct diagnostic high-frequency ultrasound to delineate tissues and determine whether to proceed with delivery of therapeutic, in addition to retrieve data necessary to develop an optimal radiation treatment process for the patient. The field placement simulation documents any change seen in overall tumor volume documented in the patient’s record, allows the clinician to indicate any needed changes in the treatment plan and/or prescription, provides diagnostic evaluation as the basis for performing the therapeutic procedure, and clearly identifies the information needed to decide to proceed with the therapeutic procedure. This process includes verification of the treatment port(s) and proper treatment positioning. All treatment ports were photographed within electronic medical records. The patient's lead blocking along with gross tumor volume and margin was confirmed. Considering superficial radiotherapy is clinical in setup, this requires the physician and radiation therapist to clarify the location interest being treated against initial images, ultrasound, pathology, and patient anatomy. Care was taken to ensure laboy treated were geometrically accurate and properly positioned using therapeutic radiology simulation-aided field setting verification per fraction. This process is also utilized to determine if any prescription or setup changes are necessary. These steps are therefore medically necessary to ensure safe and effective administration of radiation. Ongoing therapeutic radiology simulation-aided field setting verification is ordered throughout the course of therapy.  \\n\\nA high-frequency ultrasound image was acquired today for a two-dimensional evaluation of the tumor volume, depth, width, breadth, review of prior response to treatment, provide geometric accuracy of field placement, and determine whether to proceed with therapeutic delivery.\\n\\nThe field placement and ultrasound imaging, per fraction, is separate and distinct from the initial simulation and is an important task in providing safe administration of superficial radiation therapy. Physician evaluation of the ultrasound information will be ongoing throughout the course of treatment and is deemed medically necessary to ensure the efficacy of treatment, whether to proceed with therapeutic delivery, and determine any necessary changes. Today's images were evaluated for determination of continuation of treatment with the current plan or with necessary changes as appropriate. Additionally, the use of ultrasound visualization and targeted assessment allows the patient to be able to see his or her cancer(s) progress, encouraging the patient to complete and maintain compliance through the full course of prescribed radiotherapy. Per Dr. Ospina, continued ultrasound guidance and therapeutic radiology simulation-aided field setting verification per fraction is required for field placement, measurement of tumor depth, tissues evaluation, progress, acute effect monitoring, and determination for therapeutic treatment delivery is appropriate.
Treatment Documentation: This patient has been treated today with image-guided superficial radiation therapy for non-melanoma skin cancer. Written informed consent has been previously obtained from this patient for this treatment. This consent is documented in the patient's chart. The patient gave verbal consent to continue treatment today. The patient was treated with a specific radiation dose and setup as prescribed by the provider listed on this visit note.  A Radiation Therapist performed administration of radiation under the supervision of a provider. The treatment parameters and cumulative dose are indicated above. Prior to administering the radiation, the patient underwent a verification therapeutic radiology simulation-aided field setting defining relevant normal and abnormal target anatomy and acquiring images with a separate and distinct diagnostic high-frequency ultrasound to delineate tissues and determine whether to proceed with delivery of therapeutic, in addition to retrieve data necessary to develop an optimal radiation treatment process for the patient. The field placement simulation documents any change seen in overall tumor volume documented in the patient’s record, allows the clinician to indicate any needed changes in the treatment plan and/or prescription, provides diagnostic evaluation as the basis for performing the therapeutic procedure, and clearly identifies the information needed to decide to proceed with the therapeutic procedure. This process includes verification of the treatment port(s) and proper treatment positioning. All treatment ports were photographed within electronic medical records. The patient's lead blocking along with gross tumor volume and margin was confirmed. Considering superficial radiotherapy is clinical in setup, this requires the physician and radiation therapist to clarify the location interest being treated against initial images, ultrasound, pathology, and patient anatomy. Care was taken to ensure laboy treated were geometrically accurate and properly positioned using therapeutic radiology simulation-aided field setting verification per fraction. This process is also utilized to determine if any prescription or setup changes are necessary. These steps are therefore medically necessary to ensure safe and effective administration of radiation. Ongoing therapeutic radiology simulation-aided field setting verification is ordered throughout the course of therapy.  \\n\\nA high-frequency ultrasound image was acquired today for a two-dimensional evaluation of the tumor volume, depth, width, breadth, review of prior response to treatment, provide geometric accuracy of field placement, and determine whether to proceed with therapeutic delivery.\\n\\nThe field placement and ultrasound imaging, per fraction, is separate and distinct from the initial simulation and is an important task in providing safe administration of superficial radiation therapy. Physician evaluation of the ultrasound information will be ongoing throughout the course of treatment and is deemed medically necessary to ensure the efficacy of treatment, whether to proceed with therapeutic delivery, and determine any necessary changes. Today's images were evaluated for determination of continuation of treatment with the current plan or with necessary changes as appropriate. Additionally, the use of ultrasound visualization and targeted assessment allows the patient to be able to see his or her cancer(s) progress, encouraging the patient to complete and maintain compliance through the full course of prescribed radiotherapy. Per Dr. Ospina, continued ultrasound guidance and therapeutic radiology simulation-aided field setting verification per fraction is required for field placement, measurement of tumor depth, tissues evaluation, progress, acute effect monitoring, and determination for therapeutic treatment delivery is appropriate.
Additional Comments (Add Customization Of Note Here): The patient states that a scab continues to come and go at this treatment site. I encouraged him to continue to use Vaseline so a thick scab does not form.
Ultrasound Used Text: High frequency ultrasound depth is 2.59 mm, which is 0.98 mm in difference from previous imaging.

## 2023-09-19 ENCOUNTER — APPOINTMENT (OUTPATIENT)
Dept: URBAN - METROPOLITAN AREA CLINIC 259 | Age: 83
Setting detail: DERMATOLOGY
End: 2023-09-22

## 2023-09-19 PROBLEM — C44.399 OTHER SPECIFIED MALIGNANT NEOPLASM OF SKIN OF OTHER PARTS OF FACE: Status: ACTIVE | Noted: 2023-09-19

## 2023-09-19 PROBLEM — C44.311 BASAL CELL CARCINOMA OF SKIN OF NOSE: Status: ACTIVE | Noted: 2023-09-19

## 2023-09-19 PROBLEM — C44.319 BASAL CELL CARCINOMA OF SKIN OF OTHER PARTS OF FACE: Status: ACTIVE | Noted: 2023-09-19

## 2023-09-19 PROBLEM — C44.01 BASAL CELL CARCINOMA OF SKIN OF LIP: Status: ACTIVE | Noted: 2023-09-19

## 2023-09-19 PROCEDURE — OTHER IMAGE GUIDED - SUPERFICIAL RADIOTHERAPY: TREATMENT VISIT: OTHER

## 2023-09-19 PROCEDURE — OTHER IMAGE GUIDED - SUPERFICIAL RADIOTHERAPY: OTHER

## 2023-09-19 PROCEDURE — 77401 RADIATION TX DELIVERY SUPFC: CPT

## 2023-09-19 PROCEDURE — 77280 THER RAD SIMULAJ FIELD SMPL: CPT

## 2023-09-19 PROCEDURE — G6001 ECHO GUIDANCE RADIOTHERAPY: HCPCS | Mod: XU

## 2023-09-19 NOTE — PROCEDURE: IMAGE GUIDED - SUPERFICIAL RADIOTHERAPY: TREATMENT VISIT
Calculate Total Cumulative Dose Automatically Or Manually: Manually
Show Ultrasound In Note?: Yes
Total Cumulative Dose (Cgy): 3229.20
Additional Change To Daily Dosage Administered Mid Treatment?: No
Add X Modifier?: JURADO - Unusual Non-Overlapping Service
Ultrasound Used Text: High frequency ultrasound depth is 2.24 mm, which is 0.52 mm in difference from previous imaging.
Prescription Used: 1
Ultrasound Not Used Text: Ultrasound was not performed today due to
Fraction Number: 12
Treatment Documentation: This patient has been treated today with image-guided superficial radiation therapy for non-melanoma skin cancer. Written informed consent has been previously obtained from this patient for this treatment. This consent is documented in the patient's chart. The patient gave verbal consent to continue treatment today. The patient was treated with a specific radiation dose and setup as prescribed by the provider listed on this visit note.  A Radiation Therapist performed administration of radiation under the supervision of a provider. The treatment parameters and cumulative dose are indicated above. Prior to administering the radiation, the patient underwent a verification therapeutic radiology simulation-aided field setting defining relevant normal and abnormal target anatomy and acquiring images with a separate and distinct diagnostic high-frequency ultrasound to delineate tissues and determine whether to proceed with delivery of therapeutic, in addition to retrieve data necessary to develop an optimal radiation treatment process for the patient. The field placement simulation documents any change seen in overall tumor volume documented in the patient’s record, allows the clinician to indicate any needed changes in the treatment plan and/or prescription, provides diagnostic evaluation as the basis for performing the therapeutic procedure, and clearly identifies the information needed to decide to proceed with the therapeutic procedure. This process includes verification of the treatment port(s) and proper treatment positioning. All treatment ports were photographed within electronic medical records. The patient's lead blocking along with gross tumor volume and margin was confirmed. Considering superficial radiotherapy is clinical in setup, this requires the physician and radiation therapist to clarify the location interest being treated against initial images, ultrasound, pathology, and patient anatomy. Care was taken to ensure laboy treated were geometrically accurate and properly positioned using therapeutic radiology simulation-aided field setting verification per fraction. This process is also utilized to determine if any prescription or setup changes are necessary. These steps are therefore medically necessary to ensure safe and effective administration of radiation. Ongoing therapeutic radiology simulation-aided field setting verification is ordered throughout the course of therapy.  \\n\\nA high-frequency ultrasound image was acquired today for a two-dimensional evaluation of the tumor volume, depth, width, breadth, review of prior response to treatment, provide geometric accuracy of field placement, and determine whether to proceed with therapeutic delivery.\\n\\nThe field placement and ultrasound imaging, per fraction, is separate and distinct from the initial simulation and is an important task in providing safe administration of superficial radiation therapy. Physician evaluation of the ultrasound information will be ongoing throughout the course of treatment and is deemed medically necessary to ensure the efficacy of treatment, whether to proceed with therapeutic delivery, and determine any necessary changes. Today's images were evaluated for determination of continuation of treatment with the current plan or with necessary changes as appropriate. Additionally, the use of ultrasound visualization and targeted assessment allows the patient to be able to see his or her cancer(s) progress, encouraging the patient to complete and maintain compliance through the full course of prescribed radiotherapy. Per Dr. Ospina, continued ultrasound guidance and therapeutic radiology simulation-aided field setting verification per fraction is required for field placement, measurement of tumor depth, tissues evaluation, progress, acute effect monitoring, and determination for therapeutic treatment delivery is appropriate.
Energy (Kv): 100
Daily Dosage (Cgy): 269.10
Additional Comments (Add Customization Of Note Here): The patient is doing well today with no questions or concerns regarding his treatment.
Treatment Documentation: This patient has been treated today with image-guided superficial radiation therapy for non-melanoma skin cancer. Written informed consent has been previously obtained from this patient for this treatment. This consent is documented in the patient's chart. The patient gave verbal consent to continue treatment today. The patient was treated with a specific radiation dose and setup as prescribed by the provider listed on this visit note.  A Radiation Therapist performed administration of radiation under the supervision of a provider. The treatment parameters and cumulative dose are indicated above. Prior to administering the radiation, the patient underwent a verification therapeutic radiology simulation-aided field setting defining relevant normal and abnormal target anatomy and acquiring images with a separate and distinct diagnostic high-frequency ultrasound to delineate tissues and determine whether to proceed with delivery of therapeutic, in addition to retrieve data necessary to develop an optimal radiation treatment process for the patient. The field placement simulation documents any change seen in overall tumor volume documented in the patient’s record, allows the clinician to indicate any needed changes in the treatment plan and/or prescription, provides diagnostic evaluation as the basis for performing the therapeutic procedure, and clearly identifies the information needed to decide to proceed with the therapeutic procedure. This process includes verification of the treatment port(s) and proper treatment positioning. All treatment ports were photographed within electronic medical records. The patient's lead blocking along with gross tumor volume and margin was confirmed. Considering superficial radiotherapy is clinical in setup, this requires the physician and radiation therapist to clarify the location interest being treated against initial images, ultrasound, pathology, and patient anatomy. Care was taken to ensure laboy treated were geometrically accurate and properly positioned using therapeutic radiology simulation-aided field setting verification per fraction. This process is also utilized to determine if any prescription or setup changes are necessary. These steps are therefore medically necessary to ensure safe and effective administration of radiation. Ongoing therapeutic radiology simulation-aided field setting verification is ordered throughout the course of therapy.  \\n\\nA high-frequency ultrasound image was acquired today for a two-dimensional evaluation of the tumor volume, depth, width, breadth, review of prior response to treatment, provide geometric accuracy of field placement, and determine whether to proceed with therapeutic delivery.\\n\\nThe field placement and ultrasound imaging, per fraction, is separate and distinct from the initial simulation and is an important task in providing safe administration of superficial radiation therapy. Physician evaluation of the ultrasound information will be ongoing throughout the course of treatment and is deemed medically necessary to ensure the efficacy of treatment, whether to proceed with therapeutic delivery, and determine any necessary changes. Today's images were evaluated for determination of continuation of treatment with the current plan or with necessary changes as appropriate. Additionally, the use of ultrasound visualization and targeted assessment allows the patient to be able to see his or her cancer(s) progress, encouraging the patient to complete and maintain compliance through the full course of prescribed radiotherapy. Per Dr. Ospina, continued ultrasound guidance and therapeutic radiology simulation-aided field setting verification per fraction is required for field placement, measurement of tumor depth, tissues evaluation, progress, acute effect monitoring, and determination for therapeutic treatment delivery is appropriate.
Daily Dosage (Cgy): 267.46
Total Cumulative Dose (Cgy): 3209.52
Ultrasound Used Text: High frequency ultrasound depth is 1.19 mm, which is 0.14 mm in difference from previous imaging.
Daily Dosage (Cgy): 264.04
Energy (Kv): 70
Total Cumulative Dose (Cgy): 3168.48
Ultrasound Used Text: High frequency ultrasound depth is 0.89 mm, which is 0.34 mm in difference from previous imaging.
Treatment Documentation: This patient has been treated today with image-guided superficial radiation therapy for non-melanoma skin cancer. Written informed consent has been previously obtained from this patient for this treatment. This consent is documented in the patient's chart. The patient gave verbal consent to continue treatment today. The patient was treated with a specific radiation dose and setup as prescribed by the provider listed on this visit note.  A Radiation Therapist performed administration of radiation under the supervision of a provider. The treatment parameters and cumulative dose are indicated above. Prior to administering the radiation, the patient underwent a verification therapeutic radiology simulation-aided field setting defining relevant normal and abnormal target anatomy and acquiring images with a separate and distinct diagnostic high-frequency ultrasound to delineate tissues and determine whether to proceed with delivery of therapeutic, in addition to retrieve data necessary to develop an optimal radiation treatment process for the patient. The field placement simulation documents any change seen in overall tumor volume documented in the patient’s record, allows the clinician to indicate any needed changes in the treatment plan and/or prescription, provides diagnostic evaluation as the basis for performing the therapeutic procedure, and clearly identifies the information needed to decide to proceed with the therapeutic procedure. This process includes verification of the treatment port(s) and proper treatment positioning. All treatment ports were photographed within electronic medical records. The patient's lead blocking along with gross tumor volume and margin was confirmed. Considering superficial radiotherapy is clinical in setup, this requires the physician and radiation therapist to clarify the location interest being treated against initial images, ultrasound, pathology, and patient anatomy. Care was taken to ensure laboy treated were geometrically accurate and properly positioned using therapeutic radiology simulation-aided field setting verification per fraction. This process is also utilized to determine if any prescription or setup changes are necessary. These steps are therefore medically necessary to ensure safe and effective administration of radiation. Ongoing therapeutic radiology simulation-aided field setting verification is ordered throughout the course of therapy.  \\n\\nA high-frequency ultrasound image was acquired today for a two-dimensional evaluation of the tumor volume, depth, width, breadth, review of prior response to treatment, provide geometric accuracy of field placement, and determine whether to proceed with therapeutic delivery.\\n\\nThe field placement and ultrasound imaging, per fraction, is separate and distinct from the initial simulation and is an important task in providing safe administration of superficial radiation therapy. Physician evaluation of the ultrasound information will be ongoing throughout the course of treatment and is deemed medically necessary to ensure the efficacy of treatment, whether to proceed with therapeutic delivery, and determine any necessary changes. Today's images were evaluated for determination of continuation of treatment with the current plan or with necessary changes as appropriate. Additionally, the use of ultrasound visualization and targeted assessment allows the patient to be able to see his or her cancer(s) progress, encouraging the patient to complete and maintain compliance through the full course of prescribed radiotherapy. Per Dr. Ospina, continued ultrasound guidance and therapeutic radiology simulation-aided field setting verification per fraction is required for field placement, measurement of tumor depth, tissues evaluation, progress, acute effect monitoring, and determination for therapeutic treatment delivery is appropriate.
Treatment Documentation: This patient has been treated today with image-guided superficial radiation therapy for non-melanoma skin cancer. Written informed consent has been previously obtained from this patient for this treatment. This consent is documented in the patient's chart. The patient gave verbal consent to continue treatment today. The patient was treated with a specific radiation dose and setup as prescribed by the provider listed on this visit note.  A Radiation Therapist performed administration of radiation under the supervision of a provider. The treatment parameters and cumulative dose are indicated above. Prior to administering the radiation, the patient underwent a verification therapeutic radiology simulation-aided field setting defining relevant normal and abnormal target anatomy and acquiring images with a separate and distinct diagnostic high-frequency ultrasound to delineate tissues and determine whether to proceed with delivery of therapeutic, in addition to retrieve data necessary to develop an optimal radiation treatment process for the patient. The field placement simulation documents any change seen in overall tumor volume documented in the patient’s record, allows the clinician to indicate any needed changes in the treatment plan and/or prescription, provides diagnostic evaluation as the basis for performing the therapeutic procedure, and clearly identifies the information needed to decide to proceed with the therapeutic procedure. This process includes verification of the treatment port(s) and proper treatment positioning. All treatment ports were photographed within electronic medical records. The patient's lead blocking along with gross tumor volume and margin was confirmed. Considering superficial radiotherapy is clinical in setup, this requires the physician and radiation therapist to clarify the location interest being treated against initial images, ultrasound, pathology, and patient anatomy. Care was taken to ensure laboy treated were geometrically accurate and properly positioned using therapeutic radiology simulation-aided field setting verification per fraction. This process is also utilized to determine if any prescription or setup changes are necessary. These steps are therefore medically necessary to ensure safe and effective administration of radiation. Ongoing therapeutic radiology simulation-aided field setting verification is ordered throughout the course of therapy.  \\n\\nA high-frequency ultrasound image was acquired today for a two-dimensional evaluation of the tumor volume, depth, width, breadth, review of prior response to treatment, provide geometric accuracy of field placement, and determine whether to proceed with therapeutic delivery.\\n\\nThe field placement and ultrasound imaging, per fraction, is separate and distinct from the initial simulation and is an important task in providing safe administration of superficial radiation therapy. Physician evaluation of the ultrasound information will be ongoing throughout the course of treatment and is deemed medically necessary to ensure the efficacy of treatment, whether to proceed with therapeutic delivery, and determine any necessary changes. Today's images were evaluated for determination of continuation of treatment with the current plan or with necessary changes as appropriate. Additionally, the use of ultrasound visualization and targeted assessment allows the patient to be able to see his or her cancer(s) progress, encouraging the patient to complete and maintain compliance through the full course of prescribed radiotherapy. Per Dr. Ospina, continued ultrasound guidance and therapeutic radiology simulation-aided field setting verification per fraction is required for field placement, measurement of tumor depth, tissues evaluation, progress, acute effect monitoring, and determination for therapeutic treatment delivery is appropriate.
Additional Comments (Add Customization Of Note Here): The patient states that the treatment site looks and feels much better today compared to yesterday as the crusty scab/dried blood came off.
Ultrasound Used Text: High frequency ultrasound depth is 1.84 mm, which is 0.75 mm in difference from previous imaging.

## 2023-09-20 ENCOUNTER — APPOINTMENT (OUTPATIENT)
Dept: URBAN - METROPOLITAN AREA CLINIC 259 | Age: 83
Setting detail: DERMATOLOGY
End: 2023-09-22

## 2023-09-20 PROBLEM — C44.311 BASAL CELL CARCINOMA OF SKIN OF NOSE: Status: ACTIVE | Noted: 2023-09-20

## 2023-09-20 PROBLEM — C44.399 OTHER SPECIFIED MALIGNANT NEOPLASM OF SKIN OF OTHER PARTS OF FACE: Status: ACTIVE | Noted: 2023-09-20

## 2023-09-20 PROBLEM — C44.319 BASAL CELL CARCINOMA OF SKIN OF OTHER PARTS OF FACE: Status: ACTIVE | Noted: 2023-09-20

## 2023-09-20 PROBLEM — C44.01 BASAL CELL CARCINOMA OF SKIN OF LIP: Status: ACTIVE | Noted: 2023-09-20

## 2023-09-20 PROCEDURE — G6001 ECHO GUIDANCE RADIOTHERAPY: HCPCS | Mod: XU

## 2023-09-20 PROCEDURE — OTHER IMAGE GUIDED - SUPERFICIAL RADIOTHERAPY: TREATMENT VISIT: OTHER

## 2023-09-20 PROCEDURE — OTHER IMAGE GUIDED - SUPERFICIAL RADIOTHERAPY: OTHER

## 2023-09-20 PROCEDURE — 77401 RADIATION TX DELIVERY SUPFC: CPT

## 2023-09-20 PROCEDURE — 77280 THER RAD SIMULAJ FIELD SMPL: CPT

## 2023-09-20 NOTE — PROCEDURE: IMAGE GUIDED - SUPERFICIAL RADIOTHERAPY: TREATMENT VISIT
Calculate Total Cumulative Dose Automatically Or Manually: Manually
Show Ultrasound In Note?: Yes
Total Cumulative Dose (Cgy): 3498.30
Additional Change To Daily Dosage Administered Mid Treatment?: No
Add X Modifier?: JURADO - Unusual Non-Overlapping Service
Ultrasound Used Text: High frequency ultrasound depth is 2.07 mm, which is 0.17 mm in difference from previous imaging.
Prescription Used: 1
Ultrasound Not Used Text: Ultrasound was not performed today due to
Fraction Number: 13
Treatment Documentation: This patient has been treated today with image-guided superficial radiation therapy for non-melanoma skin cancer. Written informed consent has been previously obtained from this patient for this treatment. This consent is documented in the patient's chart. The patient gave verbal consent to continue treatment today. The patient was treated with a specific radiation dose and setup as prescribed by the provider listed on this visit note.  A Radiation Therapist performed administration of radiation under the supervision of a provider. The treatment parameters and cumulative dose are indicated above. Prior to administering the radiation, the patient underwent a verification therapeutic radiology simulation-aided field setting defining relevant normal and abnormal target anatomy and acquiring images with a separate and distinct diagnostic high-frequency ultrasound to delineate tissues and determine whether to proceed with delivery of therapeutic, in addition to retrieve data necessary to develop an optimal radiation treatment process for the patient. The field placement simulation documents any change seen in overall tumor volume documented in the patient’s record, allows the clinician to indicate any needed changes in the treatment plan and/or prescription, provides diagnostic evaluation as the basis for performing the therapeutic procedure, and clearly identifies the information needed to decide to proceed with the therapeutic procedure. This process includes verification of the treatment port(s) and proper treatment positioning. All treatment ports were photographed within electronic medical records. The patient's lead blocking along with gross tumor volume and margin was confirmed. Considering superficial radiotherapy is clinical in setup, this requires the physician and radiation therapist to clarify the location interest being treated against initial images, ultrasound, pathology, and patient anatomy. Care was taken to ensure laboy treated were geometrically accurate and properly positioned using therapeutic radiology simulation-aided field setting verification per fraction. This process is also utilized to determine if any prescription or setup changes are necessary. These steps are therefore medically necessary to ensure safe and effective administration of radiation. Ongoing therapeutic radiology simulation-aided field setting verification is ordered throughout the course of therapy.  \\n\\nA high-frequency ultrasound image was acquired today for a two-dimensional evaluation of the tumor volume, depth, width, breadth, review of prior response to treatment, provide geometric accuracy of field placement, and determine whether to proceed with therapeutic delivery.\\n\\nThe field placement and ultrasound imaging, per fraction, is separate and distinct from the initial simulation and is an important task in providing safe administration of superficial radiation therapy. Physician evaluation of the ultrasound information will be ongoing throughout the course of treatment and is deemed medically necessary to ensure the efficacy of treatment, whether to proceed with therapeutic delivery, and determine any necessary changes. Today's images were evaluated for determination of continuation of treatment with the current plan or with necessary changes as appropriate. Additionally, the use of ultrasound visualization and targeted assessment allows the patient to be able to see his or her cancer(s) progress, encouraging the patient to complete and maintain compliance through the full course of prescribed radiotherapy. Per Dr. Ospina, continued ultrasound guidance and therapeutic radiology simulation-aided field setting verification per fraction is required for field placement, measurement of tumor depth, tissues evaluation, progress, acute effect monitoring, and determination for therapeutic treatment delivery is appropriate.
Energy (Kv): 100
Daily Dosage (Cgy): 269.10
Additional Comments (Add Customization Of Note Here): The patient's ultrasound continues to show improvement.
Treatment Documentation: This patient has been treated today with image-guided superficial radiation therapy for non-melanoma skin cancer. Written informed consent has been previously obtained from this patient for this treatment. This consent is documented in the patient's chart. The patient gave verbal consent to continue treatment today. The patient was treated with a specific radiation dose and setup as prescribed by the provider listed on this visit note.  A Radiation Therapist performed administration of radiation under the supervision of a provider. The treatment parameters and cumulative dose are indicated above. Prior to administering the radiation, the patient underwent a verification therapeutic radiology simulation-aided field setting defining relevant normal and abnormal target anatomy and acquiring images with a separate and distinct diagnostic high-frequency ultrasound to delineate tissues and determine whether to proceed with delivery of therapeutic, in addition to retrieve data necessary to develop an optimal radiation treatment process for the patient. The field placement simulation documents any change seen in overall tumor volume documented in the patient’s record, allows the clinician to indicate any needed changes in the treatment plan and/or prescription, provides diagnostic evaluation as the basis for performing the therapeutic procedure, and clearly identifies the information needed to decide to proceed with the therapeutic procedure. This process includes verification of the treatment port(s) and proper treatment positioning. All treatment ports were photographed within electronic medical records. The patient's lead blocking along with gross tumor volume and margin was confirmed. Considering superficial radiotherapy is clinical in setup, this requires the physician and radiation therapist to clarify the location interest being treated against initial images, ultrasound, pathology, and patient anatomy. Care was taken to ensure laboy treated were geometrically accurate and properly positioned using therapeutic radiology simulation-aided field setting verification per fraction. This process is also utilized to determine if any prescription or setup changes are necessary. These steps are therefore medically necessary to ensure safe and effective administration of radiation. Ongoing therapeutic radiology simulation-aided field setting verification is ordered throughout the course of therapy.  \\n\\nA high-frequency ultrasound image was acquired today for a two-dimensional evaluation of the tumor volume, depth, width, breadth, review of prior response to treatment, provide geometric accuracy of field placement, and determine whether to proceed with therapeutic delivery.\\n\\nThe field placement and ultrasound imaging, per fraction, is separate and distinct from the initial simulation and is an important task in providing safe administration of superficial radiation therapy. Physician evaluation of the ultrasound information will be ongoing throughout the course of treatment and is deemed medically necessary to ensure the efficacy of treatment, whether to proceed with therapeutic delivery, and determine any necessary changes. Today's images were evaluated for determination of continuation of treatment with the current plan or with necessary changes as appropriate. Additionally, the use of ultrasound visualization and targeted assessment allows the patient to be able to see his or her cancer(s) progress, encouraging the patient to complete and maintain compliance through the full course of prescribed radiotherapy. Per Dr. Ospina, continued ultrasound guidance and therapeutic radiology simulation-aided field setting verification per fraction is required for field placement, measurement of tumor depth, tissues evaluation, progress, acute effect monitoring, and determination for therapeutic treatment delivery is appropriate.
Daily Dosage (Cgy): 267.46
Total Cumulative Dose (Cgy): 3476.98
Ultrasound Used Text: High frequency ultrasound depth is 1.35 mm, which is 0.16 mm in difference from previous imaging.
Daily Dosage (Cgy): 264.04
Energy (Kv): 70
Total Cumulative Dose (Cgy): 3432.52
Ultrasound Used Text: High frequency ultrasound depth is 1.01 mm, which is 0.12 mm in difference from previous imaging.
Treatment Documentation: This patient has been treated today with image-guided superficial radiation therapy for non-melanoma skin cancer. Written informed consent has been previously obtained from this patient for this treatment. This consent is documented in the patient's chart. The patient gave verbal consent to continue treatment today. The patient was treated with a specific radiation dose and setup as prescribed by the provider listed on this visit note.  A Radiation Therapist performed administration of radiation under the supervision of a provider. The treatment parameters and cumulative dose are indicated above. Prior to administering the radiation, the patient underwent a verification therapeutic radiology simulation-aided field setting defining relevant normal and abnormal target anatomy and acquiring images with a separate and distinct diagnostic high-frequency ultrasound to delineate tissues and determine whether to proceed with delivery of therapeutic, in addition to retrieve data necessary to develop an optimal radiation treatment process for the patient. The field placement simulation documents any change seen in overall tumor volume documented in the patient’s record, allows the clinician to indicate any needed changes in the treatment plan and/or prescription, provides diagnostic evaluation as the basis for performing the therapeutic procedure, and clearly identifies the information needed to decide to proceed with the therapeutic procedure. This process includes verification of the treatment port(s) and proper treatment positioning. All treatment ports were photographed within electronic medical records. The patient's lead blocking along with gross tumor volume and margin was confirmed. Considering superficial radiotherapy is clinical in setup, this requires the physician and radiation therapist to clarify the location interest being treated against initial images, ultrasound, pathology, and patient anatomy. Care was taken to ensure laboy treated were geometrically accurate and properly positioned using therapeutic radiology simulation-aided field setting verification per fraction. This process is also utilized to determine if any prescription or setup changes are necessary. These steps are therefore medically necessary to ensure safe and effective administration of radiation. Ongoing therapeutic radiology simulation-aided field setting verification is ordered throughout the course of therapy.  \\n\\nA high-frequency ultrasound image was acquired today for a two-dimensional evaluation of the tumor volume, depth, width, breadth, review of prior response to treatment, provide geometric accuracy of field placement, and determine whether to proceed with therapeutic delivery.\\n\\nThe field placement and ultrasound imaging, per fraction, is separate and distinct from the initial simulation and is an important task in providing safe administration of superficial radiation therapy. Physician evaluation of the ultrasound information will be ongoing throughout the course of treatment and is deemed medically necessary to ensure the efficacy of treatment, whether to proceed with therapeutic delivery, and determine any necessary changes. Today's images were evaluated for determination of continuation of treatment with the current plan or with necessary changes as appropriate. Additionally, the use of ultrasound visualization and targeted assessment allows the patient to be able to see his or her cancer(s) progress, encouraging the patient to complete and maintain compliance through the full course of prescribed radiotherapy. Per Dr. Ospina, continued ultrasound guidance and therapeutic radiology simulation-aided field setting verification per fraction is required for field placement, measurement of tumor depth, tissues evaluation, progress, acute effect monitoring, and determination for therapeutic treatment delivery is appropriate.
Treatment Documentation: This patient has been treated today with image-guided superficial radiation therapy for non-melanoma skin cancer. Written informed consent has been previously obtained from this patient for this treatment. This consent is documented in the patient's chart. The patient gave verbal consent to continue treatment today. The patient was treated with a specific radiation dose and setup as prescribed by the provider listed on this visit note.  A Radiation Therapist performed administration of radiation under the supervision of a provider. The treatment parameters and cumulative dose are indicated above. Prior to administering the radiation, the patient underwent a verification therapeutic radiology simulation-aided field setting defining relevant normal and abnormal target anatomy and acquiring images with a separate and distinct diagnostic high-frequency ultrasound to delineate tissues and determine whether to proceed with delivery of therapeutic, in addition to retrieve data necessary to develop an optimal radiation treatment process for the patient. The field placement simulation documents any change seen in overall tumor volume documented in the patient’s record, allows the clinician to indicate any needed changes in the treatment plan and/or prescription, provides diagnostic evaluation as the basis for performing the therapeutic procedure, and clearly identifies the information needed to decide to proceed with the therapeutic procedure. This process includes verification of the treatment port(s) and proper treatment positioning. All treatment ports were photographed within electronic medical records. The patient's lead blocking along with gross tumor volume and margin was confirmed. Considering superficial radiotherapy is clinical in setup, this requires the physician and radiation therapist to clarify the location interest being treated against initial images, ultrasound, pathology, and patient anatomy. Care was taken to ensure laboy treated were geometrically accurate and properly positioned using therapeutic radiology simulation-aided field setting verification per fraction. This process is also utilized to determine if any prescription or setup changes are necessary. These steps are therefore medically necessary to ensure safe and effective administration of radiation. Ongoing therapeutic radiology simulation-aided field setting verification is ordered throughout the course of therapy.  \\n\\nA high-frequency ultrasound image was acquired today for a two-dimensional evaluation of the tumor volume, depth, width, breadth, review of prior response to treatment, provide geometric accuracy of field placement, and determine whether to proceed with therapeutic delivery.\\n\\nThe field placement and ultrasound imaging, per fraction, is separate and distinct from the initial simulation and is an important task in providing safe administration of superficial radiation therapy. Physician evaluation of the ultrasound information will be ongoing throughout the course of treatment and is deemed medically necessary to ensure the efficacy of treatment, whether to proceed with therapeutic delivery, and determine any necessary changes. Today's images were evaluated for determination of continuation of treatment with the current plan or with necessary changes as appropriate. Additionally, the use of ultrasound visualization and targeted assessment allows the patient to be able to see his or her cancer(s) progress, encouraging the patient to complete and maintain compliance through the full course of prescribed radiotherapy. Per Dr. Ospina, continued ultrasound guidance and therapeutic radiology simulation-aided field setting verification per fraction is required for field placement, measurement of tumor depth, tissues evaluation, progress, acute effect monitoring, and determination for therapeutic treatment delivery is appropriate.
Ultrasound Used Text: High frequency ultrasound depth is 2.26 mm, which is 0.42 mm in difference from previous imaging.

## 2023-09-21 ENCOUNTER — APPOINTMENT (OUTPATIENT)
Dept: URBAN - METROPOLITAN AREA CLINIC 259 | Age: 83
Setting detail: DERMATOLOGY
End: 2023-09-22

## 2023-09-21 PROBLEM — C44.319 BASAL CELL CARCINOMA OF SKIN OF OTHER PARTS OF FACE: Status: ACTIVE | Noted: 2023-09-21

## 2023-09-21 PROBLEM — C44.399 OTHER SPECIFIED MALIGNANT NEOPLASM OF SKIN OF OTHER PARTS OF FACE: Status: ACTIVE | Noted: 2023-09-21

## 2023-09-21 PROBLEM — C44.311 BASAL CELL CARCINOMA OF SKIN OF NOSE: Status: ACTIVE | Noted: 2023-09-21

## 2023-09-21 PROBLEM — C44.01 BASAL CELL CARCINOMA OF SKIN OF LIP: Status: ACTIVE | Noted: 2023-09-21

## 2023-09-21 PROCEDURE — G6001 ECHO GUIDANCE RADIOTHERAPY: HCPCS | Mod: XU

## 2023-09-21 PROCEDURE — 77280 THER RAD SIMULAJ FIELD SMPL: CPT

## 2023-09-21 PROCEDURE — OTHER IMAGE GUIDED - SUPERFICIAL RADIOTHERAPY: TREATMENT VISIT: OTHER

## 2023-09-21 PROCEDURE — 77401 RADIATION TX DELIVERY SUPFC: CPT

## 2023-09-21 PROCEDURE — OTHER IMAGE GUIDED - SUPERFICIAL RADIOTHERAPY: OTHER

## 2023-09-21 NOTE — PROCEDURE: IMAGE GUIDED - SUPERFICIAL RADIOTHERAPY: TREATMENT VISIT
Calculate Total Cumulative Dose Automatically Or Manually: Manually
Show Ultrasound In Note?: Yes
Total Cumulative Dose (Cgy): 3767.40
Additional Change To Daily Dosage Administered Mid Treatment?: No
Add X Modifier?: JURADO - Unusual Non-Overlapping Service
Ultrasound Used Text: High frequency ultrasound depth is 1.62 mm, which is 0.45 mm in difference from previous imaging.
Prescription Used: 1
Ultrasound Not Used Text: Ultrasound was not performed today due to
Fraction Number: 14
Treatment Documentation: This patient has been treated today with image-guided superficial radiation therapy for non-melanoma skin cancer. Written informed consent has been previously obtained from this patient for this treatment. This consent is documented in the patient's chart. The patient gave verbal consent to continue treatment today. The patient was treated with a specific radiation dose and setup as prescribed by the provider listed on this visit note.  A Radiation Therapist performed administration of radiation under the supervision of a provider. The treatment parameters and cumulative dose are indicated above. Prior to administering the radiation, the patient underwent a verification therapeutic radiology simulation-aided field setting defining relevant normal and abnormal target anatomy and acquiring images with a separate and distinct diagnostic high-frequency ultrasound to delineate tissues and determine whether to proceed with delivery of therapeutic, in addition to retrieve data necessary to develop an optimal radiation treatment process for the patient. The field placement simulation documents any change seen in overall tumor volume documented in the patient’s record, allows the clinician to indicate any needed changes in the treatment plan and/or prescription, provides diagnostic evaluation as the basis for performing the therapeutic procedure, and clearly identifies the information needed to decide to proceed with the therapeutic procedure. This process includes verification of the treatment port(s) and proper treatment positioning. All treatment ports were photographed within electronic medical records. The patient's lead blocking along with gross tumor volume and margin was confirmed. Considering superficial radiotherapy is clinical in setup, this requires the physician and radiation therapist to clarify the location interest being treated against initial images, ultrasound, pathology, and patient anatomy. Care was taken to ensure laboy treated were geometrically accurate and properly positioned using therapeutic radiology simulation-aided field setting verification per fraction. This process is also utilized to determine if any prescription or setup changes are necessary. These steps are therefore medically necessary to ensure safe and effective administration of radiation. Ongoing therapeutic radiology simulation-aided field setting verification is ordered throughout the course of therapy.  \\n\\nA high-frequency ultrasound image was acquired today for a two-dimensional evaluation of the tumor volume, depth, width, breadth, review of prior response to treatment, provide geometric accuracy of field placement, and determine whether to proceed with therapeutic delivery.\\n\\nThe field placement and ultrasound imaging, per fraction, is separate and distinct from the initial simulation and is an important task in providing safe administration of superficial radiation therapy. Physician evaluation of the ultrasound information will be ongoing throughout the course of treatment and is deemed medically necessary to ensure the efficacy of treatment, whether to proceed with therapeutic delivery, and determine any necessary changes. Today's images were evaluated for determination of continuation of treatment with the current plan or with necessary changes as appropriate. Additionally, the use of ultrasound visualization and targeted assessment allows the patient to be able to see his or her cancer(s) progress, encouraging the patient to complete and maintain compliance through the full course of prescribed radiotherapy. Per Dr. Ospina, continued ultrasound guidance and therapeutic radiology simulation-aided field setting verification per fraction is required for field placement, measurement of tumor depth, tissues evaluation, progress, acute effect monitoring, and determination for therapeutic treatment delivery is appropriate.
Energy (Kv): 100
Daily Dosage (Cgy): 269.10
Additional Comments (Add Customization Of Note Here): The patient is doing well today with no questions or concerns regarding his treatments.
Treatment Documentation: This patient has been treated today with image-guided superficial radiation therapy for non-melanoma skin cancer. Written informed consent has been previously obtained from this patient for this treatment. This consent is documented in the patient's chart. The patient gave verbal consent to continue treatment today. The patient was treated with a specific radiation dose and setup as prescribed by the provider listed on this visit note.  A Radiation Therapist performed administration of radiation under the supervision of a provider. The treatment parameters and cumulative dose are indicated above. Prior to administering the radiation, the patient underwent a verification therapeutic radiology simulation-aided field setting defining relevant normal and abnormal target anatomy and acquiring images with a separate and distinct diagnostic high-frequency ultrasound to delineate tissues and determine whether to proceed with delivery of therapeutic, in addition to retrieve data necessary to develop an optimal radiation treatment process for the patient. The field placement simulation documents any change seen in overall tumor volume documented in the patient’s record, allows the clinician to indicate any needed changes in the treatment plan and/or prescription, provides diagnostic evaluation as the basis for performing the therapeutic procedure, and clearly identifies the information needed to decide to proceed with the therapeutic procedure. This process includes verification of the treatment port(s) and proper treatment positioning. All treatment ports were photographed within electronic medical records. The patient's lead blocking along with gross tumor volume and margin was confirmed. Considering superficial radiotherapy is clinical in setup, this requires the physician and radiation therapist to clarify the location interest being treated against initial images, ultrasound, pathology, and patient anatomy. Care was taken to ensure laboy treated were geometrically accurate and properly positioned using therapeutic radiology simulation-aided field setting verification per fraction. This process is also utilized to determine if any prescription or setup changes are necessary. These steps are therefore medically necessary to ensure safe and effective administration of radiation. Ongoing therapeutic radiology simulation-aided field setting verification is ordered throughout the course of therapy.  \\n\\nA high-frequency ultrasound image was acquired today for a two-dimensional evaluation of the tumor volume, depth, width, breadth, review of prior response to treatment, provide geometric accuracy of field placement, and determine whether to proceed with therapeutic delivery.\\n\\nThe field placement and ultrasound imaging, per fraction, is separate and distinct from the initial simulation and is an important task in providing safe administration of superficial radiation therapy. Physician evaluation of the ultrasound information will be ongoing throughout the course of treatment and is deemed medically necessary to ensure the efficacy of treatment, whether to proceed with therapeutic delivery, and determine any necessary changes. Today's images were evaluated for determination of continuation of treatment with the current plan or with necessary changes as appropriate. Additionally, the use of ultrasound visualization and targeted assessment allows the patient to be able to see his or her cancer(s) progress, encouraging the patient to complete and maintain compliance through the full course of prescribed radiotherapy. Per Dr. Ospina, continued ultrasound guidance and therapeutic radiology simulation-aided field setting verification per fraction is required for field placement, measurement of tumor depth, tissues evaluation, progress, acute effect monitoring, and determination for therapeutic treatment delivery is appropriate.
Daily Dosage (Cgy): 267.46
Total Cumulative Dose (Cgy): 3744.44
Ultrasound Used Text: High frequency ultrasound depth is 1.23 mm, which is 0.12 mm in difference from previous imaging.
Additional Comments (Add Customization Of Note Here): The patient is doing well today without any questions or concerns regarding his treatments.
Daily Dosage (Cgy): 264.04
Energy (Kv): 70
Total Cumulative Dose (Cgy): 3696.56
Ultrasound Used Text: High frequency ultrasound depth is 0.95 mm, which is 0.06 mm in difference from previous imaging.
Treatment Documentation: This patient has been treated today with image-guided superficial radiation therapy for non-melanoma skin cancer. Written informed consent has been previously obtained from this patient for this treatment. This consent is documented in the patient's chart. The patient gave verbal consent to continue treatment today. The patient was treated with a specific radiation dose and setup as prescribed by the provider listed on this visit note.  A Radiation Therapist performed administration of radiation under the supervision of a provider. The treatment parameters and cumulative dose are indicated above. Prior to administering the radiation, the patient underwent a verification therapeutic radiology simulation-aided field setting defining relevant normal and abnormal target anatomy and acquiring images with a separate and distinct diagnostic high-frequency ultrasound to delineate tissues and determine whether to proceed with delivery of therapeutic, in addition to retrieve data necessary to develop an optimal radiation treatment process for the patient. The field placement simulation documents any change seen in overall tumor volume documented in the patient’s record, allows the clinician to indicate any needed changes in the treatment plan and/or prescription, provides diagnostic evaluation as the basis for performing the therapeutic procedure, and clearly identifies the information needed to decide to proceed with the therapeutic procedure. This process includes verification of the treatment port(s) and proper treatment positioning. All treatment ports were photographed within electronic medical records. The patient's lead blocking along with gross tumor volume and margin was confirmed. Considering superficial radiotherapy is clinical in setup, this requires the physician and radiation therapist to clarify the location interest being treated against initial images, ultrasound, pathology, and patient anatomy. Care was taken to ensure laboy treated were geometrically accurate and properly positioned using therapeutic radiology simulation-aided field setting verification per fraction. This process is also utilized to determine if any prescription or setup changes are necessary. These steps are therefore medically necessary to ensure safe and effective administration of radiation. Ongoing therapeutic radiology simulation-aided field setting verification is ordered throughout the course of therapy.  \\n\\nA high-frequency ultrasound image was acquired today for a two-dimensional evaluation of the tumor volume, depth, width, breadth, review of prior response to treatment, provide geometric accuracy of field placement, and determine whether to proceed with therapeutic delivery.\\n\\nThe field placement and ultrasound imaging, per fraction, is separate and distinct from the initial simulation and is an important task in providing safe administration of superficial radiation therapy. Physician evaluation of the ultrasound information will be ongoing throughout the course of treatment and is deemed medically necessary to ensure the efficacy of treatment, whether to proceed with therapeutic delivery, and determine any necessary changes. Today's images were evaluated for determination of continuation of treatment with the current plan or with necessary changes as appropriate. Additionally, the use of ultrasound visualization and targeted assessment allows the patient to be able to see his or her cancer(s) progress, encouraging the patient to complete and maintain compliance through the full course of prescribed radiotherapy. Per Dr. Ospina, continued ultrasound guidance and therapeutic radiology simulation-aided field setting verification per fraction is required for field placement, measurement of tumor depth, tissues evaluation, progress, acute effect monitoring, and determination for therapeutic treatment delivery is appropriate.
Ultrasound Used Text: High frequency ultrasound depth is 2.61 mm, which is 0.35 mm in difference from previous imaging.

## 2023-09-23 ENCOUNTER — APPOINTMENT (OUTPATIENT)
Dept: URBAN - METROPOLITAN AREA CLINIC 259 | Age: 83
Setting detail: DERMATOLOGY
End: 2023-09-23

## 2023-09-23 PROBLEM — C44.311 BASAL CELL CARCINOMA OF SKIN OF NOSE: Status: ACTIVE | Noted: 2023-09-23

## 2023-09-23 PROBLEM — C44.01 BASAL CELL CARCINOMA OF SKIN OF LIP: Status: ACTIVE | Noted: 2023-09-23

## 2023-09-23 PROBLEM — C44.399 OTHER SPECIFIED MALIGNANT NEOPLASM OF SKIN OF OTHER PARTS OF FACE: Status: ACTIVE | Noted: 2023-09-23

## 2023-09-23 PROBLEM — C44.319 BASAL CELL CARCINOMA OF SKIN OF OTHER PARTS OF FACE: Status: ACTIVE | Noted: 2023-09-23

## 2023-09-23 PROCEDURE — 77336 RADIATION PHYSICS CONSULT: CPT

## 2023-09-23 PROCEDURE — OTHER IMAGE GUIDED - SUPERFICIAL RADIOTHERAPY: OTHER

## 2023-09-23 NOTE — PROCEDURE: IMAGE GUIDED - SUPERFICIAL RADIOTHERAPY
Body Location Override (Optional): Left Nasal Ala
Detail Level: Detailed
Pathology Override (Pathology Will Render As Diagnosis Name If Left Blank): Primary Infiltrative Basal Cell Carcinoma
Field Number: 1
Lesion Dimensions-X Axis In Cm: 0.8
Lesion Dimensions-Y Axis In Cm: 0.9
Lesion Margin Size In Cm: 0.5
Applicator Size In Cm: 1.5 cm
Energy (Kv): 100
Treatment Time (Min): 0.46
Time, Dose, Fractionation Factor (Tdf) For Prescription 1: 99
Daily Dose (Cgy): 269.10
Number Of Fractions For Prescription 1: 20
Treatments Per Week: 4
Total Dose For Prescription 1 (Cgy): 5382.00
Add A Second Prescription?: No
Additional Fraction(S) Needed:: 0
Add Physics Consultation: Yes
Physics Consultation Performed For Fractions:: 7-14
Physics Documentation: Per the request of Dr. Ospina, continuing medical physics review as per radiotherapy standard of care post every 5th fraction for patient, including assessment of treatment parameters,  of dose delivery, and review of patient treatment documentation in support of the provider, to ensure efficacy and continued safe delivery of radiotherapy. Included in physics check is review of patient setup information, all pertinent simulation and treatment photographs checks, prescription, dose calculation verification, per fraction dose charted correctly, elapsed days and treatment days correctly charted, cumulative dose correct, and review of any prescription changes. Patient was not present, nor was it necessary for the patient to be present for weekly physics review and no other superficial radiotherapy services were rendered on this day. Continued medical physics review post every 5th fraction of therapy is requested by provider for appropriate radiotherapy management and is deemed medically necessary and standard of care.
Body Location Override (Optional): Left Central Zygoma
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
Field Number: 2
Lesion Dimensions-X Axis In Cm: 0.7
Shield Size In Cm: 1.7 X 1.7
Applicator Size In Cm: 2.5 cm
Treatment Time (Min): 0.43
Time, Dose, Fractionation Factor (Tdf) For Prescription 1: 98
Daily Dose (Cgy): 267.46
Total Dose For Prescription 1 (Cgy): 5349.20
Physics Documentation: Per the request of Dr. Ospina, continuing medical physics review as per radiotherapy standard of care post every 5th fraction for patient, including assessment of treatment parameters,  of dose delivery, and review of patient treatment documentation in support of the provider, to ensure efficacy and continued safe delivery of radiotherapy. Included in physics check is review of patient setup information, all pertinent simulation and treatment photographs checks, prescription, dose calculation verification, per fraction dose charted correctly, elapsed days and treatment days correctly charted, cumulative dose correct, and review of any prescription changes. Patient was not present, nor was it necessary for the patient to be present for weekly physics review and no other superficial radiotherapy services were rendered on this day. Continued medical physics review post every 5th fraction of therapy is requested by provider for appropriate radiotherapy management and is deemed medically necessary and standard of care.
Body Location Override (Optional): Left Superior Medial Forehead
Pathology Override (Pathology Will Render As Diagnosis Name If Left Blank): Primary Nodular Basal Cell Carcinoma
Field Number: 3
Shield Size In Cm: 2.0 x 2.0
Energy (Kv): 70
Treatment Time (Min): 0.41
Time, Dose, Fractionation Factor (Tdf) For Prescription 1: 96
Daily Dose (Cgy): 264.04
Total Dose For Prescription 1 (Cgy): 5280.80
Physics Documentation: Per the request of Dr. Ospina, continuing medical physics review as per radiotherapy standard of care post every 5th fraction for patient, including assessment of treatment parameters,  of dose delivery, and review of patient treatment documentation in support of the provider, to ensure efficacy and continued safe delivery of radiotherapy. Included in physics check is review of patient setup information, all pertinent simulation and treatment photographs checks, prescription, dose calculation verification, per fraction dose charted correctly, elapsed days and treatment days correctly charted, cumulative dose correct, and review of any prescription changes. Patient was not present, nor was it necessary for the patient to be present for weekly physics review and no other superficial radiotherapy services were rendered on this day. Continued medical physics review post every 5th fraction of therapy is requested by provider for appropriate radiotherapy management and is deemed medically necessary and standard of care.
Body Location Override (Optional): Right Nasolabial Fold
Physics Documentation: Per the request of Dr. Ospina, continuing medical physics review as per radiotherapy standard of care post every 5th fraction for patient, including assessment of treatment parameters,  of dose delivery, and review of patient treatment documentation in support of the provider, to ensure efficacy and continued safe delivery of radiotherapy. Included in physics check is review of patient setup information, all pertinent simulation and treatment photographs checks, prescription, dose calculation verification, per fraction dose charted correctly, elapsed days and treatment days correctly charted, cumulative dose correct, and review of any prescription changes. Patient was not present, nor was it necessary for the patient to be present for weekly physics review and no other superficial radiotherapy services were rendered on this day. Continued medical physics review post every 5th fraction of therapy is requested by provider for appropriate radiotherapy management and is deemed medically necessary and standard of care.

## 2023-09-25 ENCOUNTER — APPOINTMENT (OUTPATIENT)
Dept: URBAN - METROPOLITAN AREA CLINIC 259 | Age: 83
Setting detail: DERMATOLOGY
End: 2023-09-25

## 2023-09-25 PROBLEM — C44.01 BASAL CELL CARCINOMA OF SKIN OF LIP: Status: ACTIVE | Noted: 2023-09-25

## 2023-09-25 PROBLEM — C44.399 OTHER SPECIFIED MALIGNANT NEOPLASM OF SKIN OF OTHER PARTS OF FACE: Status: ACTIVE | Noted: 2023-09-25

## 2023-09-25 PROBLEM — C44.319 BASAL CELL CARCINOMA OF SKIN OF OTHER PARTS OF FACE: Status: ACTIVE | Noted: 2023-09-25

## 2023-09-25 PROBLEM — C44.311 BASAL CELL CARCINOMA OF SKIN OF NOSE: Status: ACTIVE | Noted: 2023-09-25

## 2023-09-25 PROCEDURE — 77401 RADIATION TX DELIVERY SUPFC: CPT

## 2023-09-25 PROCEDURE — OTHER IMAGE GUIDED - SUPERFICIAL RADIOTHERAPY: EVALUATION VISIT: OTHER

## 2023-09-25 PROCEDURE — OTHER IMAGE GUIDED - SUPERFICIAL RADIOTHERAPY: OTHER

## 2023-09-25 PROCEDURE — 77427 RADIATION TX MANAGEMENT X5: CPT

## 2023-09-25 PROCEDURE — G6001 ECHO GUIDANCE RADIOTHERAPY: HCPCS | Mod: XU

## 2023-09-25 PROCEDURE — 77280 THER RAD SIMULAJ FIELD SMPL: CPT

## 2023-09-25 PROCEDURE — OTHER IMAGE GUIDED - SUPERFICIAL RADIOTHERAPY: TREATMENT VISIT: OTHER

## 2023-09-25 NOTE — PROCEDURE: IMAGE GUIDED - SUPERFICIAL RADIOTHERAPY: EVALUATION VISIT
Was Ultrasound Performed Today?: No
Ultrasound Used Text: Ultrasound depth is mm.
Assessment: Appropriate reaction
Evaluation Plan: The patient is undergoing superficial radiation therapy for skin cancer and presents for weekly evaluation and management. Per protocol and as documented on the flow sheet, the patient was questioned as to subjective redness, pruritus, pain, drainage, fatigue, or any other symptoms. Objectively, the radiation area was evaluated with regards to erythema, atrophy, scale, crusting, erosion, ulceration, edema, purpura, tenderness, warmth, drainage, and any other findings. The plan was extensively reviewed including dose and dosing schedule. The simulation and clinical setup were also reviewed as were external and any internal shields and based on this review the appropriateness and sufficiency of treatment was determined.
Is This Visit For Evaluation During Treatment Or Follow Up Post Treatment?: evaluation
Bill For Evaluation (31300)?: Yes
Radiation Therapy Oncology Group (Rtog) Score: 2
Ultrasound Not Used Text: Ultrasound was not performed today due to
Radiation Therapy Oncology Group (Rtog) Score: 1

## 2023-09-25 NOTE — PROCEDURE: IMAGE GUIDED - SUPERFICIAL RADIOTHERAPY: TREATMENT VISIT
Calculate Total Cumulative Dose Automatically Or Manually: Manually
Show Ultrasound In Note?: Yes
Total Cumulative Dose (Cgy): 4036.50
Additional Change To Daily Dosage Administered Mid Treatment?: No
Add X Modifier?: JURADO - Unusual Non-Overlapping Service
Ultrasound Used Text: High frequency ultrasound depth is 1.72 mm, which is 0.10 mm in difference from previous imaging.
Prescription Used: 1
Ultrasound Not Used Text: Ultrasound was not performed today due to
Fraction Number: 15
Treatment Documentation: This patient has been treated today with image-guided superficial radiation therapy for non-melanoma skin cancer. Written informed consent has been previously obtained from this patient for this treatment. This consent is documented in the patient's chart. The patient gave verbal consent to continue treatment today. The patient was treated with a specific radiation dose and setup as prescribed by the provider listed on this visit note.  A Radiation Therapist performed administration of radiation under the supervision of a provider. The treatment parameters and cumulative dose are indicated above. Prior to administering the radiation, the patient underwent a verification therapeutic radiology simulation-aided field setting defining relevant normal and abnormal target anatomy and acquiring images with a separate and distinct diagnostic high-frequency ultrasound to delineate tissues and determine whether to proceed with delivery of therapeutic, in addition to retrieve data necessary to develop an optimal radiation treatment process for the patient. The field placement simulation documents any change seen in overall tumor volume documented in the patient’s record, allows the clinician to indicate any needed changes in the treatment plan and/or prescription, provides diagnostic evaluation as the basis for performing the therapeutic procedure, and clearly identifies the information needed to decide to proceed with the therapeutic procedure. This process includes verification of the treatment port(s) and proper treatment positioning. All treatment ports were photographed within electronic medical records. The patient's lead blocking along with gross tumor volume and margin was confirmed. Considering superficial radiotherapy is clinical in setup, this requires the physician and radiation therapist to clarify the location interest being treated against initial images, ultrasound, pathology, and patient anatomy. Care was taken to ensure laboy treated were geometrically accurate and properly positioned using therapeutic radiology simulation-aided field setting verification per fraction. This process is also utilized to determine if any prescription or setup changes are necessary. These steps are therefore medically necessary to ensure safe and effective administration of radiation. Ongoing therapeutic radiology simulation-aided field setting verification is ordered throughout the course of therapy.  \\n\\nA high-frequency ultrasound image was acquired today for a two-dimensional evaluation of the tumor volume, depth, width, breadth, review of prior response to treatment, provide geometric accuracy of field placement, and determine whether to proceed with therapeutic delivery.\\n\\nThe field placement and ultrasound imaging, per fraction, is separate and distinct from the initial simulation and is an important task in providing safe administration of superficial radiation therapy. Physician evaluation of the ultrasound information will be ongoing throughout the course of treatment and is deemed medically necessary to ensure the efficacy of treatment, whether to proceed with therapeutic delivery, and determine any necessary changes. Today's images were evaluated for determination of continuation of treatment with the current plan or with necessary changes as appropriate. Additionally, the use of ultrasound visualization and targeted assessment allows the patient to be able to see his or her cancer(s) progress, encouraging the patient to complete and maintain compliance through the full course of prescribed radiotherapy. Per Dr. Ospina, continued ultrasound guidance and therapeutic radiology simulation-aided field setting verification per fraction is required for field placement, measurement of tumor depth, tissues evaluation, progress, acute effect monitoring, and determination for therapeutic treatment delivery is appropriate.
Energy (Kv): 100
Daily Dosage (Cgy): 269.10
Additional Comments (Add Customization Of Note Here): The patient's skin was evaluated and all side effects look appropriate for being on fraction number fifteen.
Treatment Documentation: This patient has been treated today with image-guided superficial radiation therapy for non-melanoma skin cancer. Written informed consent has been previously obtained from this patient for this treatment. This consent is documented in the patient's chart. The patient gave verbal consent to continue treatment today. The patient was treated with a specific radiation dose and setup as prescribed by the provider listed on this visit note.  A Radiation Therapist performed administration of radiation under the supervision of a provider. The treatment parameters and cumulative dose are indicated above. Prior to administering the radiation, the patient underwent a verification therapeutic radiology simulation-aided field setting defining relevant normal and abnormal target anatomy and acquiring images with a separate and distinct diagnostic high-frequency ultrasound to delineate tissues and determine whether to proceed with delivery of therapeutic, in addition to retrieve data necessary to develop an optimal radiation treatment process for the patient. The field placement simulation documents any change seen in overall tumor volume documented in the patient’s record, allows the clinician to indicate any needed changes in the treatment plan and/or prescription, provides diagnostic evaluation as the basis for performing the therapeutic procedure, and clearly identifies the information needed to decide to proceed with the therapeutic procedure. This process includes verification of the treatment port(s) and proper treatment positioning. All treatment ports were photographed within electronic medical records. The patient's lead blocking along with gross tumor volume and margin was confirmed. Considering superficial radiotherapy is clinical in setup, this requires the physician and radiation therapist to clarify the location interest being treated against initial images, ultrasound, pathology, and patient anatomy. Care was taken to ensure laboy treated were geometrically accurate and properly positioned using therapeutic radiology simulation-aided field setting verification per fraction. This process is also utilized to determine if any prescription or setup changes are necessary. These steps are therefore medically necessary to ensure safe and effective administration of radiation. Ongoing therapeutic radiology simulation-aided field setting verification is ordered throughout the course of therapy.  \\n\\nA high-frequency ultrasound image was acquired today for a two-dimensional evaluation of the tumor volume, depth, width, breadth, review of prior response to treatment, provide geometric accuracy of field placement, and determine whether to proceed with therapeutic delivery.\\n\\nThe field placement and ultrasound imaging, per fraction, is separate and distinct from the initial simulation and is an important task in providing safe administration of superficial radiation therapy. Physician evaluation of the ultrasound information will be ongoing throughout the course of treatment and is deemed medically necessary to ensure the efficacy of treatment, whether to proceed with therapeutic delivery, and determine any necessary changes. Today's images were evaluated for determination of continuation of treatment with the current plan or with necessary changes as appropriate. Additionally, the use of ultrasound visualization and targeted assessment allows the patient to be able to see his or her cancer(s) progress, encouraging the patient to complete and maintain compliance through the full course of prescribed radiotherapy. Per Dr. Ospina, continued ultrasound guidance and therapeutic radiology simulation-aided field setting verification per fraction is required for field placement, measurement of tumor depth, tissues evaluation, progress, acute effect monitoring, and determination for therapeutic treatment delivery is appropriate.
Daily Dosage (Cgy): 267.46
Total Cumulative Dose (Cgy): 4011.90
Ultrasound Used Text: High frequency ultrasound depth is 1.64 mm, which is 0.41 mm in difference from previous imaging.
Daily Dosage (Cgy): 264.04
Energy (Kv): 70
Total Cumulative Dose (Cgy): 3960.60
Ultrasound Used Text: High frequency ultrasound depth is 1.62 mm, which is 0.67 mm in difference from previous imaging.
Treatment Documentation: This patient has been treated today with image-guided superficial radiation therapy for non-melanoma skin cancer. Written informed consent has been previously obtained from this patient for this treatment. This consent is documented in the patient's chart. The patient gave verbal consent to continue treatment today. The patient was treated with a specific radiation dose and setup as prescribed by the provider listed on this visit note.  A Radiation Therapist performed administration of radiation under the supervision of a provider. The treatment parameters and cumulative dose are indicated above. Prior to administering the radiation, the patient underwent a verification therapeutic radiology simulation-aided field setting defining relevant normal and abnormal target anatomy and acquiring images with a separate and distinct diagnostic high-frequency ultrasound to delineate tissues and determine whether to proceed with delivery of therapeutic, in addition to retrieve data necessary to develop an optimal radiation treatment process for the patient. The field placement simulation documents any change seen in overall tumor volume documented in the patient’s record, allows the clinician to indicate any needed changes in the treatment plan and/or prescription, provides diagnostic evaluation as the basis for performing the therapeutic procedure, and clearly identifies the information needed to decide to proceed with the therapeutic procedure. This process includes verification of the treatment port(s) and proper treatment positioning. All treatment ports were photographed within electronic medical records. The patient's lead blocking along with gross tumor volume and margin was confirmed. Considering superficial radiotherapy is clinical in setup, this requires the physician and radiation therapist to clarify the location interest being treated against initial images, ultrasound, pathology, and patient anatomy. Care was taken to ensure laboy treated were geometrically accurate and properly positioned using therapeutic radiology simulation-aided field setting verification per fraction. This process is also utilized to determine if any prescription or setup changes are necessary. These steps are therefore medically necessary to ensure safe and effective administration of radiation. Ongoing therapeutic radiology simulation-aided field setting verification is ordered throughout the course of therapy.  \\n\\nA high-frequency ultrasound image was acquired today for a two-dimensional evaluation of the tumor volume, depth, width, breadth, review of prior response to treatment, provide geometric accuracy of field placement, and determine whether to proceed with therapeutic delivery.\\n\\nThe field placement and ultrasound imaging, per fraction, is separate and distinct from the initial simulation and is an important task in providing safe administration of superficial radiation therapy. Physician evaluation of the ultrasound information will be ongoing throughout the course of treatment and is deemed medically necessary to ensure the efficacy of treatment, whether to proceed with therapeutic delivery, and determine any necessary changes. Today's images were evaluated for determination of continuation of treatment with the current plan or with necessary changes as appropriate. Additionally, the use of ultrasound visualization and targeted assessment allows the patient to be able to see his or her cancer(s) progress, encouraging the patient to complete and maintain compliance through the full course of prescribed radiotherapy. Per Dr. Ospina, continued ultrasound guidance and therapeutic radiology simulation-aided field setting verification per fraction is required for field placement, measurement of tumor depth, tissues evaluation, progress, acute effect monitoring, and determination for therapeutic treatment delivery is appropriate.
Treatment Documentation: This patient has been treated today with image-guided superficial radiation therapy for non-melanoma skin cancer. Written informed consent has been previously obtained from this patient for this treatment. This consent is documented in the patient's chart. The patient gave verbal consent to continue treatment today. The patient was treated with a specific radiation dose and setup as prescribed by the provider listed on this visit note.  A Radiation Therapist performed administration of radiation under the supervision of a provider. The treatment parameters and cumulative dose are indicated above. Prior to administering the radiation, the patient underwent a verification therapeutic radiology simulation-aided field setting defining relevant normal and abnormal target anatomy and acquiring images with a separate and distinct diagnostic high-frequency ultrasound to delineate tissues and determine whether to proceed with delivery of therapeutic, in addition to retrieve data necessary to develop an optimal radiation treatment process for the patient. The field placement simulation documents any change seen in overall tumor volume documented in the patient’s record, allows the clinician to indicate any needed changes in the treatment plan and/or prescription, provides diagnostic evaluation as the basis for performing the therapeutic procedure, and clearly identifies the information needed to decide to proceed with the therapeutic procedure. This process includes verification of the treatment port(s) and proper treatment positioning. All treatment ports were photographed within electronic medical records. The patient's lead blocking along with gross tumor volume and margin was confirmed. Considering superficial radiotherapy is clinical in setup, this requires the physician and radiation therapist to clarify the location interest being treated against initial images, ultrasound, pathology, and patient anatomy. Care was taken to ensure laboy treated were geometrically accurate and properly positioned using therapeutic radiology simulation-aided field setting verification per fraction. This process is also utilized to determine if any prescription or setup changes are necessary. These steps are therefore medically necessary to ensure safe and effective administration of radiation. Ongoing therapeutic radiology simulation-aided field setting verification is ordered throughout the course of therapy.  \\n\\nA high-frequency ultrasound image was acquired today for a two-dimensional evaluation of the tumor volume, depth, width, breadth, review of prior response to treatment, provide geometric accuracy of field placement, and determine whether to proceed with therapeutic delivery.\\n\\nThe field placement and ultrasound imaging, per fraction, is separate and distinct from the initial simulation and is an important task in providing safe administration of superficial radiation therapy. Physician evaluation of the ultrasound information will be ongoing throughout the course of treatment and is deemed medically necessary to ensure the efficacy of treatment, whether to proceed with therapeutic delivery, and determine any necessary changes. Today's images were evaluated for determination of continuation of treatment with the current plan or with necessary changes as appropriate. Additionally, the use of ultrasound visualization and targeted assessment allows the patient to be able to see his or her cancer(s) progress, encouraging the patient to complete and maintain compliance through the full course of prescribed radiotherapy. Per Dr. Ospina, continued ultrasound guidance and therapeutic radiology simulation-aided field setting verification per fraction is required for field placement, measurement of tumor depth, tissues evaluation, progress, acute effect monitoring, and determination for therapeutic treatment delivery is appropriate.
Ultrasound Used Text: High frequency ultrasound depth is 1.75 mm, which is 0. 86 mm in difference from previous imaging.

## 2023-09-26 ENCOUNTER — APPOINTMENT (OUTPATIENT)
Dept: URBAN - METROPOLITAN AREA CLINIC 259 | Age: 83
Setting detail: DERMATOLOGY
End: 2023-09-26

## 2023-09-26 PROBLEM — C44.311 BASAL CELL CARCINOMA OF SKIN OF NOSE: Status: ACTIVE | Noted: 2023-09-26

## 2023-09-26 PROBLEM — C44.399 OTHER SPECIFIED MALIGNANT NEOPLASM OF SKIN OF OTHER PARTS OF FACE: Status: ACTIVE | Noted: 2023-09-26

## 2023-09-26 PROBLEM — C44.01 BASAL CELL CARCINOMA OF SKIN OF LIP: Status: ACTIVE | Noted: 2023-09-26

## 2023-09-26 PROBLEM — C44.319 BASAL CELL CARCINOMA OF SKIN OF OTHER PARTS OF FACE: Status: ACTIVE | Noted: 2023-09-26

## 2023-09-26 PROCEDURE — OTHER IMAGE GUIDED - SUPERFICIAL RADIOTHERAPY: TREATMENT VISIT: OTHER

## 2023-09-26 PROCEDURE — G6001 ECHO GUIDANCE RADIOTHERAPY: HCPCS | Mod: XU

## 2023-09-26 PROCEDURE — OTHER IMAGE GUIDED - SUPERFICIAL RADIOTHERAPY: OTHER

## 2023-09-26 PROCEDURE — 77280 THER RAD SIMULAJ FIELD SMPL: CPT

## 2023-09-26 PROCEDURE — 77401 RADIATION TX DELIVERY SUPFC: CPT

## 2023-09-26 NOTE — PROCEDURE: IMAGE GUIDED - SUPERFICIAL RADIOTHERAPY: TREATMENT VISIT
Calculate Total Cumulative Dose Automatically Or Manually: Manually
Show Ultrasound In Note?: Yes
Total Cumulative Dose (Cgy): 4305.60
Additional Change To Daily Dosage Administered Mid Treatment?: No
Add X Modifier?: JURADO - Unusual Non-Overlapping Service
Ultrasound Used Text: High frequency ultrasound depth is 1.88 mm, which is 0.16 mm in difference from previous imaging.
Prescription Used: 1
Ultrasound Not Used Text: Ultrasound was not performed today due to
Fraction Number: 16
Treatment Documentation: This patient has been treated today with image-guided superficial radiation therapy for non-melanoma skin cancer. Written informed consent has been previously obtained from this patient for this treatment. This consent is documented in the patient's chart. The patient gave verbal consent to continue treatment today. The patient was treated with a specific radiation dose and setup as prescribed by the provider listed on this visit note.  A Radiation Therapist performed administration of radiation under the supervision of a provider. The treatment parameters and cumulative dose are indicated above. Prior to administering the radiation, the patient underwent a verification therapeutic radiology simulation-aided field setting defining relevant normal and abnormal target anatomy and acquiring images with a separate and distinct diagnostic high-frequency ultrasound to delineate tissues and determine whether to proceed with delivery of therapeutic, in addition to retrieve data necessary to develop an optimal radiation treatment process for the patient. The field placement simulation documents any change seen in overall tumor volume documented in the patient’s record, allows the clinician to indicate any needed changes in the treatment plan and/or prescription, provides diagnostic evaluation as the basis for performing the therapeutic procedure, and clearly identifies the information needed to decide to proceed with the therapeutic procedure. This process includes verification of the treatment port(s) and proper treatment positioning. All treatment ports were photographed within electronic medical records. The patient's lead blocking along with gross tumor volume and margin was confirmed. Considering superficial radiotherapy is clinical in setup, this requires the physician and radiation therapist to clarify the location interest being treated against initial images, ultrasound, pathology, and patient anatomy. Care was taken to ensure laboy treated were geometrically accurate and properly positioned using therapeutic radiology simulation-aided field setting verification per fraction. This process is also utilized to determine if any prescription or setup changes are necessary. These steps are therefore medically necessary to ensure safe and effective administration of radiation. Ongoing therapeutic radiology simulation-aided field setting verification is ordered throughout the course of therapy.  \\n\\nA high-frequency ultrasound image was acquired today for a two-dimensional evaluation of the tumor volume, depth, width, breadth, review of prior response to treatment, provide geometric accuracy of field placement, and determine whether to proceed with therapeutic delivery.\\n\\nThe field placement and ultrasound imaging, per fraction, is separate and distinct from the initial simulation and is an important task in providing safe administration of superficial radiation therapy. Physician evaluation of the ultrasound information will be ongoing throughout the course of treatment and is deemed medically necessary to ensure the efficacy of treatment, whether to proceed with therapeutic delivery, and determine any necessary changes. Today's images were evaluated for determination of continuation of treatment with the current plan or with necessary changes as appropriate. Additionally, the use of ultrasound visualization and targeted assessment allows the patient to be able to see his or her cancer(s) progress, encouraging the patient to complete and maintain compliance through the full course of prescribed radiotherapy. Per Dr. Ospina, continued ultrasound guidance and therapeutic radiology simulation-aided field setting verification per fraction is required for field placement, measurement of tumor depth, tissues evaluation, progress, acute effect monitoring, and determination for therapeutic treatment delivery is appropriate.
Energy (Kv): 100
Daily Dosage (Cgy): 269.10
Additional Comments (Add Customization Of Note Here): Yefri is doing well today with no questions or concerns regarding his treatments.
Treatment Documentation: This patient has been treated today with image-guided superficial radiation therapy for non-melanoma skin cancer. Written informed consent has been previously obtained from this patient for this treatment. This consent is documented in the patient's chart. The patient gave verbal consent to continue treatment today. The patient was treated with a specific radiation dose and setup as prescribed by the provider listed on this visit note.  A Radiation Therapist performed administration of radiation under the supervision of a provider. The treatment parameters and cumulative dose are indicated above. Prior to administering the radiation, the patient underwent a verification therapeutic radiology simulation-aided field setting defining relevant normal and abnormal target anatomy and acquiring images with a separate and distinct diagnostic high-frequency ultrasound to delineate tissues and determine whether to proceed with delivery of therapeutic, in addition to retrieve data necessary to develop an optimal radiation treatment process for the patient. The field placement simulation documents any change seen in overall tumor volume documented in the patient’s record, allows the clinician to indicate any needed changes in the treatment plan and/or prescription, provides diagnostic evaluation as the basis for performing the therapeutic procedure, and clearly identifies the information needed to decide to proceed with the therapeutic procedure. This process includes verification of the treatment port(s) and proper treatment positioning. All treatment ports were photographed within electronic medical records. The patient's lead blocking along with gross tumor volume and margin was confirmed. Considering superficial radiotherapy is clinical in setup, this requires the physician and radiation therapist to clarify the location interest being treated against initial images, ultrasound, pathology, and patient anatomy. Care was taken to ensure laboy treated were geometrically accurate and properly positioned using therapeutic radiology simulation-aided field setting verification per fraction. This process is also utilized to determine if any prescription or setup changes are necessary. These steps are therefore medically necessary to ensure safe and effective administration of radiation. Ongoing therapeutic radiology simulation-aided field setting verification is ordered throughout the course of therapy.  \\n\\nA high-frequency ultrasound image was acquired today for a two-dimensional evaluation of the tumor volume, depth, width, breadth, review of prior response to treatment, provide geometric accuracy of field placement, and determine whether to proceed with therapeutic delivery.\\n\\nThe field placement and ultrasound imaging, per fraction, is separate and distinct from the initial simulation and is an important task in providing safe administration of superficial radiation therapy. Physician evaluation of the ultrasound information will be ongoing throughout the course of treatment and is deemed medically necessary to ensure the efficacy of treatment, whether to proceed with therapeutic delivery, and determine any necessary changes. Today's images were evaluated for determination of continuation of treatment with the current plan or with necessary changes as appropriate. Additionally, the use of ultrasound visualization and targeted assessment allows the patient to be able to see his or her cancer(s) progress, encouraging the patient to complete and maintain compliance through the full course of prescribed radiotherapy. Per Dr. Ospina, continued ultrasound guidance and therapeutic radiology simulation-aided field setting verification per fraction is required for field placement, measurement of tumor depth, tissues evaluation, progress, acute effect monitoring, and determination for therapeutic treatment delivery is appropriate.
Additional Comments (Add Customization Of Note Here): Yefri is doing well today with no questions or concerns regarding his treatments.
Daily Dosage (Cgy): 267.46
Total Cumulative Dose (Cgy): 4279.36
Ultrasound Used Text: High frequency ultrasound depth is 1.43 mm, which is 0.21 mm in difference from previous imaging.
Additional Comments (Add Customization Of Note Here): Yefri is doing well today with no questions or concerns regarding his treatments.
Daily Dosage (Cgy): 264.04
Energy (Kv): 70
Total Cumulative Dose (Cgy): 4224.64
Ultrasound Used Text: High frequency ultrasound depth is 1.48 mm, which is 0.14 mm in difference from previous imaging.
Treatment Documentation: This patient has been treated today with image-guided superficial radiation therapy for non-melanoma skin cancer. Written informed consent has been previously obtained from this patient for this treatment. This consent is documented in the patient's chart. The patient gave verbal consent to continue treatment today. The patient was treated with a specific radiation dose and setup as prescribed by the provider listed on this visit note.  A Radiation Therapist performed administration of radiation under the supervision of a provider. The treatment parameters and cumulative dose are indicated above. Prior to administering the radiation, the patient underwent a verification therapeutic radiology simulation-aided field setting defining relevant normal and abnormal target anatomy and acquiring images with a separate and distinct diagnostic high-frequency ultrasound to delineate tissues and determine whether to proceed with delivery of therapeutic, in addition to retrieve data necessary to develop an optimal radiation treatment process for the patient. The field placement simulation documents any change seen in overall tumor volume documented in the patient’s record, allows the clinician to indicate any needed changes in the treatment plan and/or prescription, provides diagnostic evaluation as the basis for performing the therapeutic procedure, and clearly identifies the information needed to decide to proceed with the therapeutic procedure. This process includes verification of the treatment port(s) and proper treatment positioning. All treatment ports were photographed within electronic medical records. The patient's lead blocking along with gross tumor volume and margin was confirmed. Considering superficial radiotherapy is clinical in setup, this requires the physician and radiation therapist to clarify the location interest being treated against initial images, ultrasound, pathology, and patient anatomy. Care was taken to ensure laboy treated were geometrically accurate and properly positioned using therapeutic radiology simulation-aided field setting verification per fraction. This process is also utilized to determine if any prescription or setup changes are necessary. These steps are therefore medically necessary to ensure safe and effective administration of radiation. Ongoing therapeutic radiology simulation-aided field setting verification is ordered throughout the course of therapy.  \\n\\nA high-frequency ultrasound image was acquired today for a two-dimensional evaluation of the tumor volume, depth, width, breadth, review of prior response to treatment, provide geometric accuracy of field placement, and determine whether to proceed with therapeutic delivery.\\n\\nThe field placement and ultrasound imaging, per fraction, is separate and distinct from the initial simulation and is an important task in providing safe administration of superficial radiation therapy. Physician evaluation of the ultrasound information will be ongoing throughout the course of treatment and is deemed medically necessary to ensure the efficacy of treatment, whether to proceed with therapeutic delivery, and determine any necessary changes. Today's images were evaluated for determination of continuation of treatment with the current plan or with necessary changes as appropriate. Additionally, the use of ultrasound visualization and targeted assessment allows the patient to be able to see his or her cancer(s) progress, encouraging the patient to complete and maintain compliance through the full course of prescribed radiotherapy. Per Dr. Ospina, continued ultrasound guidance and therapeutic radiology simulation-aided field setting verification per fraction is required for field placement, measurement of tumor depth, tissues evaluation, progress, acute effect monitoring, and determination for therapeutic treatment delivery is appropriate.
Additional Comments (Add Customization Of Note Here): Yefri is doing well today with no questions or concerns regarding his treatments.
Ultrasound Used Text: High frequency ultrasound depth is 1.88 mm, which is 0.13 mm in difference from previous imaging.

## 2023-09-27 ENCOUNTER — APPOINTMENT (OUTPATIENT)
Dept: URBAN - METROPOLITAN AREA CLINIC 259 | Age: 83
Setting detail: DERMATOLOGY
End: 2023-09-27

## 2023-09-27 PROBLEM — C44.399 OTHER SPECIFIED MALIGNANT NEOPLASM OF SKIN OF OTHER PARTS OF FACE: Status: ACTIVE | Noted: 2023-09-27

## 2023-09-27 PROBLEM — C44.319 BASAL CELL CARCINOMA OF SKIN OF OTHER PARTS OF FACE: Status: ACTIVE | Noted: 2023-09-27

## 2023-09-27 PROBLEM — C44.311 BASAL CELL CARCINOMA OF SKIN OF NOSE: Status: ACTIVE | Noted: 2023-09-27

## 2023-09-27 PROBLEM — C44.01 BASAL CELL CARCINOMA OF SKIN OF LIP: Status: ACTIVE | Noted: 2023-09-27

## 2023-09-27 PROCEDURE — 77280 THER RAD SIMULAJ FIELD SMPL: CPT

## 2023-09-27 PROCEDURE — G6001 ECHO GUIDANCE RADIOTHERAPY: HCPCS | Mod: XU

## 2023-09-27 PROCEDURE — OTHER IMAGE GUIDED - SUPERFICIAL RADIOTHERAPY: OTHER

## 2023-09-27 PROCEDURE — OTHER IMAGE GUIDED - SUPERFICIAL RADIOTHERAPY: TREATMENT VISIT: OTHER

## 2023-09-27 PROCEDURE — 77401 RADIATION TX DELIVERY SUPFC: CPT

## 2023-09-27 NOTE — PROCEDURE: IMAGE GUIDED - SUPERFICIAL RADIOTHERAPY: TREATMENT VISIT
Calculate Total Cumulative Dose Automatically Or Manually: Manually
Show Ultrasound In Note?: Yes
Total Cumulative Dose (Cgy): 4574.70
Additional Change To Daily Dosage Administered Mid Treatment?: No
Add X Modifier?: JURADO - Unusual Non-Overlapping Service
Ultrasound Used Text: High frequency ultrasound depth is 2.17 mm, which is 0.29 mm in difference from previous imaging.
Prescription Used: 1
Ultrasound Not Used Text: Ultrasound was not performed today due to
Fraction Number: [id number]
Treatment Documentation: This patient has been treated today with image-guided superficial radiation therapy for non-melanoma skin cancer. Written informed consent has been previously obtained from this patient for this treatment. This consent is documented in the patient's chart. The patient gave verbal consent to continue treatment today. The patient was treated with a specific radiation dose and setup as prescribed by the provider listed on this visit note.  A Radiation Therapist performed administration of radiation under the supervision of a provider. The treatment parameters and cumulative dose are indicated above. Prior to administering the radiation, the patient underwent a verification therapeutic radiology simulation-aided field setting defining relevant normal and abnormal target anatomy and acquiring images with a separate and distinct diagnostic high-frequency ultrasound to delineate tissues and determine whether to proceed with delivery of therapeutic, in addition to retrieve data necessary to develop an optimal radiation treatment process for the patient. The field placement simulation documents any change seen in overall tumor volume documented in the patient’s record, allows the clinician to indicate any needed changes in the treatment plan and/or prescription, provides diagnostic evaluation as the basis for performing the therapeutic procedure, and clearly identifies the information needed to decide to proceed with the therapeutic procedure. This process includes verification of the treatment port(s) and proper treatment positioning. All treatment ports were photographed within electronic medical records. The patient's lead blocking along with gross tumor volume and margin was confirmed. Considering superficial radiotherapy is clinical in setup, this requires the physician and radiation therapist to clarify the location interest being treated against initial images, ultrasound, pathology, and patient anatomy. Care was taken to ensure laboy treated were geometrically accurate and properly positioned using therapeutic radiology simulation-aided field setting verification per fraction. This process is also utilized to determine if any prescription or setup changes are necessary. These steps are therefore medically necessary to ensure safe and effective administration of radiation. Ongoing therapeutic radiology simulation-aided field setting verification is ordered throughout the course of therapy.  \\n\\nA high-frequency ultrasound image was acquired today for a two-dimensional evaluation of the tumor volume, depth, width, breadth, review of prior response to treatment, provide geometric accuracy of field placement, and determine whether to proceed with therapeutic delivery.\\n\\nThe field placement and ultrasound imaging, per fraction, is separate and distinct from the initial simulation and is an important task in providing safe administration of superficial radiation therapy. Physician evaluation of the ultrasound information will be ongoing throughout the course of treatment and is deemed medically necessary to ensure the efficacy of treatment, whether to proceed with therapeutic delivery, and determine any necessary changes. Today's images were evaluated for determination of continuation of treatment with the current plan or with necessary changes as appropriate. Additionally, the use of ultrasound visualization and targeted assessment allows the patient to be able to see his or her cancer(s) progress, encouraging the patient to complete and maintain compliance through the full course of prescribed radiotherapy. Per Dr. Ospina, continued ultrasound guidance and therapeutic radiology simulation-aided field setting verification per fraction is required for field placement, measurement of tumor depth, tissues evaluation, progress, acute effect monitoring, and determination for therapeutic treatment delivery is appropriate.
Energy (Kv): 100
Daily Dosage (Cgy): 269.10
Additional Comments (Add Customization Of Note Here): Yefri is doing well today with no questions or concerns regarding his treatments.
Treatment Documentation: This patient has been treated today with image-guided superficial radiation therapy for non-melanoma skin cancer. Written informed consent has been previously obtained from this patient for this treatment. This consent is documented in the patient's chart. The patient gave verbal consent to continue treatment today. The patient was treated with a specific radiation dose and setup as prescribed by the provider listed on this visit note.  A Radiation Therapist performed administration of radiation under the supervision of a provider. The treatment parameters and cumulative dose are indicated above. Prior to administering the radiation, the patient underwent a verification therapeutic radiology simulation-aided field setting defining relevant normal and abnormal target anatomy and acquiring images with a separate and distinct diagnostic high-frequency ultrasound to delineate tissues and determine whether to proceed with delivery of therapeutic, in addition to retrieve data necessary to develop an optimal radiation treatment process for the patient. The field placement simulation documents any change seen in overall tumor volume documented in the patient’s record, allows the clinician to indicate any needed changes in the treatment plan and/or prescription, provides diagnostic evaluation as the basis for performing the therapeutic procedure, and clearly identifies the information needed to decide to proceed with the therapeutic procedure. This process includes verification of the treatment port(s) and proper treatment positioning. All treatment ports were photographed within electronic medical records. The patient's lead blocking along with gross tumor volume and margin was confirmed. Considering superficial radiotherapy is clinical in setup, this requires the physician and radiation therapist to clarify the location interest being treated against initial images, ultrasound, pathology, and patient anatomy. Care was taken to ensure laboy treated were geometrically accurate and properly positioned using therapeutic radiology simulation-aided field setting verification per fraction. This process is also utilized to determine if any prescription or setup changes are necessary. These steps are therefore medically necessary to ensure safe and effective administration of radiation. Ongoing therapeutic radiology simulation-aided field setting verification is ordered throughout the course of therapy.  \\n\\nA high-frequency ultrasound image was acquired today for a two-dimensional evaluation of the tumor volume, depth, width, breadth, review of prior response to treatment, provide geometric accuracy of field placement, and determine whether to proceed with therapeutic delivery.\\n\\nThe field placement and ultrasound imaging, per fraction, is separate and distinct from the initial simulation and is an important task in providing safe administration of superficial radiation therapy. Physician evaluation of the ultrasound information will be ongoing throughout the course of treatment and is deemed medically necessary to ensure the efficacy of treatment, whether to proceed with therapeutic delivery, and determine any necessary changes. Today's images were evaluated for determination of continuation of treatment with the current plan or with necessary changes as appropriate. Additionally, the use of ultrasound visualization and targeted assessment allows the patient to be able to see his or her cancer(s) progress, encouraging the patient to complete and maintain compliance through the full course of prescribed radiotherapy. Per Dr. Ospina, continued ultrasound guidance and therapeutic radiology simulation-aided field setting verification per fraction is required for field placement, measurement of tumor depth, tissues evaluation, progress, acute effect monitoring, and determination for therapeutic treatment delivery is appropriate.
Additional Comments (Add Customization Of Note Here): Yefri is doing well today with no questions or concerns regarding his treatments.
Daily Dosage (Cgy): 267.46
Total Cumulative Dose (Cgy): 4546.82
Ultrasound Used Text: High frequency ultrasound depth is 1.53 mm, which is 0.10 mm in difference from previous imaging.
Additional Comments (Add Customization Of Note Here): Yefri is doing well today with no questions or concerns regarding his treatments.
Daily Dosage (Cgy): 264.04
Energy (Kv): 70
Total Cumulative Dose (Cgy): 4488.68
Ultrasound Used Text: High frequency ultrasound depth is 1.26 mm, which is 0.22 mm in difference from previous imaging.
Treatment Documentation: This patient has been treated today with image-guided superficial radiation therapy for non-melanoma skin cancer. Written informed consent has been previously obtained from this patient for this treatment. This consent is documented in the patient's chart. The patient gave verbal consent to continue treatment today. The patient was treated with a specific radiation dose and setup as prescribed by the provider listed on this visit note.  A Radiation Therapist performed administration of radiation under the supervision of a provider. The treatment parameters and cumulative dose are indicated above. Prior to administering the radiation, the patient underwent a verification therapeutic radiology simulation-aided field setting defining relevant normal and abnormal target anatomy and acquiring images with a separate and distinct diagnostic high-frequency ultrasound to delineate tissues and determine whether to proceed with delivery of therapeutic, in addition to retrieve data necessary to develop an optimal radiation treatment process for the patient. The field placement simulation documents any change seen in overall tumor volume documented in the patient’s record, allows the clinician to indicate any needed changes in the treatment plan and/or prescription, provides diagnostic evaluation as the basis for performing the therapeutic procedure, and clearly identifies the information needed to decide to proceed with the therapeutic procedure. This process includes verification of the treatment port(s) and proper treatment positioning. All treatment ports were photographed within electronic medical records. The patient's lead blocking along with gross tumor volume and margin was confirmed. Considering superficial radiotherapy is clinical in setup, this requires the physician and radiation therapist to clarify the location interest being treated against initial images, ultrasound, pathology, and patient anatomy. Care was taken to ensure laboy treated were geometrically accurate and properly positioned using therapeutic radiology simulation-aided field setting verification per fraction. This process is also utilized to determine if any prescription or setup changes are necessary. These steps are therefore medically necessary to ensure safe and effective administration of radiation. Ongoing therapeutic radiology simulation-aided field setting verification is ordered throughout the course of therapy.  \\n\\nA high-frequency ultrasound image was acquired today for a two-dimensional evaluation of the tumor volume, depth, width, breadth, review of prior response to treatment, provide geometric accuracy of field placement, and determine whether to proceed with therapeutic delivery.\\n\\nThe field placement and ultrasound imaging, per fraction, is separate and distinct from the initial simulation and is an important task in providing safe administration of superficial radiation therapy. Physician evaluation of the ultrasound information will be ongoing throughout the course of treatment and is deemed medically necessary to ensure the efficacy of treatment, whether to proceed with therapeutic delivery, and determine any necessary changes. Today's images were evaluated for determination of continuation of treatment with the current plan or with necessary changes as appropriate. Additionally, the use of ultrasound visualization and targeted assessment allows the patient to be able to see his or her cancer(s) progress, encouraging the patient to complete and maintain compliance through the full course of prescribed radiotherapy. Per Dr. Ospina, continued ultrasound guidance and therapeutic radiology simulation-aided field setting verification per fraction is required for field placement, measurement of tumor depth, tissues evaluation, progress, acute effect monitoring, and determination for therapeutic treatment delivery is appropriate.
Treatment Documentation: This patient has been treated today with image-guided superficial radiation therapy for non-melanoma skin cancer. Written informed consent has been previously obtained from this patient for this treatment. This consent is documented in the patient's chart. The patient gave verbal consent to continue treatment today. The patient was treated with a specific radiation dose and setup as prescribed by the provider listed on this visit note.  A Radiation Therapist performed administration of radiation under the supervision of a provider. The treatment parameters and cumulative dose are indicated above. Prior to administering the radiation, the patient underwent a verification therapeutic radiology simulation-aided field setting defining relevant normal and abnormal target anatomy and acquiring images with a separate and distinct diagnostic high-frequency ultrasound to delineate tissues and determine whether to proceed with delivery of therapeutic, in addition to retrieve data necessary to develop an optimal radiation treatment process for the patient. The field placement simulation documents any change seen in overall tumor volume documented in the patient’s record, allows the clinician to indicate any needed changes in the treatment plan and/or prescription, provides diagnostic evaluation as the basis for performing the therapeutic procedure, and clearly identifies the information needed to decide to proceed with the therapeutic procedure. This process includes verification of the treatment port(s) and proper treatment positioning. All treatment ports were photographed within electronic medical records. The patient's lead blocking along with gross tumor volume and margin was confirmed. Considering superficial radiotherapy is clinical in setup, this requires the physician and radiation therapist to clarify the location interest being treated against initial images, ultrasound, pathology, and patient anatomy. Care was taken to ensure laboy treated were geometrically accurate and properly positioned using therapeutic radiology simulation-aided field setting verification per fraction. This process is also utilized to determine if any prescription or setup changes are necessary. These steps are therefore medically necessary to ensure safe and effective administration of radiation. Ongoing therapeutic radiology simulation-aided field setting verification is ordered throughout the course of therapy.  \\n\\nA high-frequency ultrasound image was acquired today for a two-dimensional evaluation of the tumor volume, depth, width, breadth, review of prior response to treatment, provide geometric accuracy of field placement, and determine whether to proceed with therapeutic delivery.\\n\\nThe field placement and ultrasound imaging, per fraction, is separate and distinct from the initial simulation and is an important task in providing safe administration of superficial radiation therapy. Physician evaluation of the ultrasound information will be ongoing throughout the course of treatment and is deemed medically necessary to ensure the efficacy of treatment, whether to proceed with therapeutic delivery, and determine any necessary changes. Today's images were evaluated for determination of continuation of treatment with the current plan or with necessary changes as appropriate. Additionally, the use of ultrasound visualization and targeted assessment allows the patient to be able to see his or her cancer(s) progress, encouraging the patient to complete and maintain compliance through the full course of prescribed radiotherapy. Per Dr. Ospina, continued ultrasound guidance and therapeutic radiology simulation-aided field setting verification per fraction is required for field placement, measurement of tumor depth, tissues evaluation, progress, acute effect monitoring, and determination for therapeutic treatment delivery is appropriate.
Additional Comments (Add Customization Of Note Here): Yefri is doing well today with no questions or concerns regarding his treatments.
Ultrasound Used Text: High frequency ultrasound depth is 1.90 mm, which is 0.02 mm in difference from previous imaging.

## 2023-09-28 ENCOUNTER — APPOINTMENT (OUTPATIENT)
Dept: URBAN - METROPOLITAN AREA CLINIC 259 | Age: 83
Setting detail: DERMATOLOGY
End: 2023-09-28

## 2023-09-28 PROBLEM — C44.311 BASAL CELL CARCINOMA OF SKIN OF NOSE: Status: ACTIVE | Noted: 2023-09-28

## 2023-09-28 PROBLEM — C44.399 OTHER SPECIFIED MALIGNANT NEOPLASM OF SKIN OF OTHER PARTS OF FACE: Status: ACTIVE | Noted: 2023-09-28

## 2023-09-28 PROBLEM — C44.319 BASAL CELL CARCINOMA OF SKIN OF OTHER PARTS OF FACE: Status: ACTIVE | Noted: 2023-09-28

## 2023-09-28 PROBLEM — C44.01 BASAL CELL CARCINOMA OF SKIN OF LIP: Status: ACTIVE | Noted: 2023-09-28

## 2023-09-28 PROCEDURE — G6001 ECHO GUIDANCE RADIOTHERAPY: HCPCS | Mod: XU

## 2023-09-28 PROCEDURE — OTHER IMAGE GUIDED - SUPERFICIAL RADIOTHERAPY: TREATMENT VISIT: OTHER

## 2023-09-28 PROCEDURE — 77280 THER RAD SIMULAJ FIELD SMPL: CPT

## 2023-09-28 PROCEDURE — 77401 RADIATION TX DELIVERY SUPFC: CPT

## 2023-09-28 PROCEDURE — OTHER IMAGE GUIDED - SUPERFICIAL RADIOTHERAPY: OTHER

## 2023-09-28 NOTE — PROCEDURE: IMAGE GUIDED - SUPERFICIAL RADIOTHERAPY: TREATMENT VISIT
Calculate Total Cumulative Dose Automatically Or Manually: Manually
Show Ultrasound In Note?: Yes
Total Cumulative Dose (Cgy): 4843.80
Additional Change To Daily Dosage Administered Mid Treatment?: No
Add X Modifier?: JURADO - Unusual Non-Overlapping Service
Ultrasound Used Text: High frequency ultrasound depth is 2.13 mm, which is 0.04 mm in difference from previous imaging.
Prescription Used: 1
Ultrasound Not Used Text: Ultrasound was not performed today due to
Fraction Number: [id number]
Treatment Documentation: This patient has been treated today with image-guided superficial radiation therapy for non-melanoma skin cancer. Written informed consent has been previously obtained from this patient for this treatment. This consent is documented in the patient's chart. The patient gave verbal consent to continue treatment today. The patient was treated with a specific radiation dose and setup as prescribed by the provider listed on this visit note.  A Radiation Therapist performed administration of radiation under the supervision of a provider. The treatment parameters and cumulative dose are indicated above. Prior to administering the radiation, the patient underwent a verification therapeutic radiology simulation-aided field setting defining relevant normal and abnormal target anatomy and acquiring images with a separate and distinct diagnostic high-frequency ultrasound to delineate tissues and determine whether to proceed with delivery of therapeutic, in addition to retrieve data necessary to develop an optimal radiation treatment process for the patient. The field placement simulation documents any change seen in overall tumor volume documented in the patient’s record, allows the clinician to indicate any needed changes in the treatment plan and/or prescription, provides diagnostic evaluation as the basis for performing the therapeutic procedure, and clearly identifies the information needed to decide to proceed with the therapeutic procedure. This process includes verification of the treatment port(s) and proper treatment positioning. All treatment ports were photographed within electronic medical records. The patient's lead blocking along with gross tumor volume and margin was confirmed. Considering superficial radiotherapy is clinical in setup, this requires the physician and radiation therapist to clarify the location interest being treated against initial images, ultrasound, pathology, and patient anatomy. Care was taken to ensure laboy treated were geometrically accurate and properly positioned using therapeutic radiology simulation-aided field setting verification per fraction. This process is also utilized to determine if any prescription or setup changes are necessary. These steps are therefore medically necessary to ensure safe and effective administration of radiation. Ongoing therapeutic radiology simulation-aided field setting verification is ordered throughout the course of therapy.  \\n\\nA high-frequency ultrasound image was acquired today for a two-dimensional evaluation of the tumor volume, depth, width, breadth, review of prior response to treatment, provide geometric accuracy of field placement, and determine whether to proceed with therapeutic delivery.\\n\\nThe field placement and ultrasound imaging, per fraction, is separate and distinct from the initial simulation and is an important task in providing safe administration of superficial radiation therapy. Physician evaluation of the ultrasound information will be ongoing throughout the course of treatment and is deemed medically necessary to ensure the efficacy of treatment, whether to proceed with therapeutic delivery, and determine any necessary changes. Today's images were evaluated for determination of continuation of treatment with the current plan or with necessary changes as appropriate. Additionally, the use of ultrasound visualization and targeted assessment allows the patient to be able to see his or her cancer(s) progress, encouraging the patient to complete and maintain compliance through the full course of prescribed radiotherapy. Per Dr. Ospina, continued ultrasound guidance and therapeutic radiology simulation-aided field setting verification per fraction is required for field placement, measurement of tumor depth, tissues evaluation, progress, acute effect monitoring, and determination for therapeutic treatment delivery is appropriate.
Energy (Kv): 100
Daily Dosage (Cgy): 269.10
Additional Comments (Add Customization Of Note Here): Yefri's ultrasound images continue to show improvement.
Treatment Documentation: This patient has been treated today with image-guided superficial radiation therapy for non-melanoma skin cancer. Written informed consent has been previously obtained from this patient for this treatment. This consent is documented in the patient's chart. The patient gave verbal consent to continue treatment today. The patient was treated with a specific radiation dose and setup as prescribed by the provider listed on this visit note.  A Radiation Therapist performed administration of radiation under the supervision of a provider. The treatment parameters and cumulative dose are indicated above. Prior to administering the radiation, the patient underwent a verification therapeutic radiology simulation-aided field setting defining relevant normal and abnormal target anatomy and acquiring images with a separate and distinct diagnostic high-frequency ultrasound to delineate tissues and determine whether to proceed with delivery of therapeutic, in addition to retrieve data necessary to develop an optimal radiation treatment process for the patient. The field placement simulation documents any change seen in overall tumor volume documented in the patient’s record, allows the clinician to indicate any needed changes in the treatment plan and/or prescription, provides diagnostic evaluation as the basis for performing the therapeutic procedure, and clearly identifies the information needed to decide to proceed with the therapeutic procedure. This process includes verification of the treatment port(s) and proper treatment positioning. All treatment ports were photographed within electronic medical records. The patient's lead blocking along with gross tumor volume and margin was confirmed. Considering superficial radiotherapy is clinical in setup, this requires the physician and radiation therapist to clarify the location interest being treated against initial images, ultrasound, pathology, and patient anatomy. Care was taken to ensure laboy treated were geometrically accurate and properly positioned using therapeutic radiology simulation-aided field setting verification per fraction. This process is also utilized to determine if any prescription or setup changes are necessary. These steps are therefore medically necessary to ensure safe and effective administration of radiation. Ongoing therapeutic radiology simulation-aided field setting verification is ordered throughout the course of therapy.  \\n\\nA high-frequency ultrasound image was acquired today for a two-dimensional evaluation of the tumor volume, depth, width, breadth, review of prior response to treatment, provide geometric accuracy of field placement, and determine whether to proceed with therapeutic delivery.\\n\\nThe field placement and ultrasound imaging, per fraction, is separate and distinct from the initial simulation and is an important task in providing safe administration of superficial radiation therapy. Physician evaluation of the ultrasound information will be ongoing throughout the course of treatment and is deemed medically necessary to ensure the efficacy of treatment, whether to proceed with therapeutic delivery, and determine any necessary changes. Today's images were evaluated for determination of continuation of treatment with the current plan or with necessary changes as appropriate. Additionally, the use of ultrasound visualization and targeted assessment allows the patient to be able to see his or her cancer(s) progress, encouraging the patient to complete and maintain compliance through the full course of prescribed radiotherapy. Per Dr. Ospina, continued ultrasound guidance and therapeutic radiology simulation-aided field setting verification per fraction is required for field placement, measurement of tumor depth, tissues evaluation, progress, acute effect monitoring, and determination for therapeutic treatment delivery is appropriate.
Daily Dosage (Cgy): 267.46
Total Cumulative Dose (Cgy): 4814.28
Ultrasound Used Text: High frequency ultrasound depth is 1.45 mm, which is 0.08 mm in difference from previous imaging.
Daily Dosage (Cgy): 264.04
Energy (Kv): 70
Total Cumulative Dose (Cgy): 4752.72
Ultrasound Used Text: High frequency ultrasound depth is 1.14 mm, which is 0.12 mm in difference from previous imaging.
Treatment Documentation: This patient has been treated today with image-guided superficial radiation therapy for non-melanoma skin cancer. Written informed consent has been previously obtained from this patient for this treatment. This consent is documented in the patient's chart. The patient gave verbal consent to continue treatment today. The patient was treated with a specific radiation dose and setup as prescribed by the provider listed on this visit note.  A Radiation Therapist performed administration of radiation under the supervision of a provider. The treatment parameters and cumulative dose are indicated above. Prior to administering the radiation, the patient underwent a verification therapeutic radiology simulation-aided field setting defining relevant normal and abnormal target anatomy and acquiring images with a separate and distinct diagnostic high-frequency ultrasound to delineate tissues and determine whether to proceed with delivery of therapeutic, in addition to retrieve data necessary to develop an optimal radiation treatment process for the patient. The field placement simulation documents any change seen in overall tumor volume documented in the patient’s record, allows the clinician to indicate any needed changes in the treatment plan and/or prescription, provides diagnostic evaluation as the basis for performing the therapeutic procedure, and clearly identifies the information needed to decide to proceed with the therapeutic procedure. This process includes verification of the treatment port(s) and proper treatment positioning. All treatment ports were photographed within electronic medical records. The patient's lead blocking along with gross tumor volume and margin was confirmed. Considering superficial radiotherapy is clinical in setup, this requires the physician and radiation therapist to clarify the location interest being treated against initial images, ultrasound, pathology, and patient anatomy. Care was taken to ensure laboy treated were geometrically accurate and properly positioned using therapeutic radiology simulation-aided field setting verification per fraction. This process is also utilized to determine if any prescription or setup changes are necessary. These steps are therefore medically necessary to ensure safe and effective administration of radiation. Ongoing therapeutic radiology simulation-aided field setting verification is ordered throughout the course of therapy.  \\n\\nA high-frequency ultrasound image was acquired today for a two-dimensional evaluation of the tumor volume, depth, width, breadth, review of prior response to treatment, provide geometric accuracy of field placement, and determine whether to proceed with therapeutic delivery.\\n\\nThe field placement and ultrasound imaging, per fraction, is separate and distinct from the initial simulation and is an important task in providing safe administration of superficial radiation therapy. Physician evaluation of the ultrasound information will be ongoing throughout the course of treatment and is deemed medically necessary to ensure the efficacy of treatment, whether to proceed with therapeutic delivery, and determine any necessary changes. Today's images were evaluated for determination of continuation of treatment with the current plan or with necessary changes as appropriate. Additionally, the use of ultrasound visualization and targeted assessment allows the patient to be able to see his or her cancer(s) progress, encouraging the patient to complete and maintain compliance through the full course of prescribed radiotherapy. Per Dr. Ospina, continued ultrasound guidance and therapeutic radiology simulation-aided field setting verification per fraction is required for field placement, measurement of tumor depth, tissues evaluation, progress, acute effect monitoring, and determination for therapeutic treatment delivery is appropriate.
Ultrasound Used Text: High frequency ultrasound depth is 1.60 mm, which is 0.30 mm in difference from previous imaging.

## 2023-10-02 ENCOUNTER — APPOINTMENT (OUTPATIENT)
Dept: URBAN - METROPOLITAN AREA CLINIC 259 | Age: 83
Setting detail: DERMATOLOGY
End: 2023-10-09

## 2023-10-02 PROBLEM — C44.311 BASAL CELL CARCINOMA OF SKIN OF NOSE: Status: ACTIVE | Noted: 2023-10-02

## 2023-10-02 PROBLEM — C44.01 BASAL CELL CARCINOMA OF SKIN OF LIP: Status: ACTIVE | Noted: 2023-10-02

## 2023-10-02 PROBLEM — C44.319 BASAL CELL CARCINOMA OF SKIN OF OTHER PARTS OF FACE: Status: ACTIVE | Noted: 2023-10-02

## 2023-10-02 PROBLEM — C44.399 OTHER SPECIFIED MALIGNANT NEOPLASM OF SKIN OF OTHER PARTS OF FACE: Status: ACTIVE | Noted: 2023-10-02

## 2023-10-02 PROCEDURE — 77427 RADIATION TX MANAGEMENT X5: CPT

## 2023-10-02 PROCEDURE — 77280 THER RAD SIMULAJ FIELD SMPL: CPT

## 2023-10-02 PROCEDURE — 77401 RADIATION TX DELIVERY SUPFC: CPT

## 2023-10-02 PROCEDURE — OTHER IMAGE GUIDED - SUPERFICIAL RADIOTHERAPY: OTHER

## 2023-10-02 PROCEDURE — OTHER IMAGE GUIDED - SUPERFICIAL RADIOTHERAPY: TREATMENT VISIT: OTHER

## 2023-10-02 PROCEDURE — G6001 ECHO GUIDANCE RADIOTHERAPY: HCPCS | Mod: XU

## 2023-10-02 PROCEDURE — OTHER IMAGE GUIDED - SUPERFICIAL RADIOTHERAPY: EVALUATION VISIT: OTHER

## 2023-10-02 NOTE — PROCEDURE: IMAGE GUIDED - SUPERFICIAL RADIOTHERAPY: TREATMENT VISIT
Calculate Total Cumulative Dose Automatically Or Manually: Manually
Show Ultrasound In Note?: Yes
Total Cumulative Dose (Cgy): 5112.90
Additional Change To Daily Dosage Administered Mid Treatment?: No
Add X Modifier?: JURADO - Unusual Non-Overlapping Service
Ultrasound Used Text: High frequency ultrasound depth is 2.67 mm, which is 0.54 mm in difference from previous imaging.
Prescription Used: 1
Ultrasound Not Used Text: Ultrasound was not performed today due to
Fraction Number: [id number]
Treatment Documentation: This patient has been treated today with image-guided superficial radiation therapy for non-melanoma skin cancer. Written informed consent has been previously obtained from this patient for this treatment. This consent is documented in the patient's chart. The patient gave verbal consent to continue treatment today. The patient was treated with a specific radiation dose and setup as prescribed by the provider listed on this visit note.  A Radiation Therapist performed administration of radiation under the supervision of a provider. The treatment parameters and cumulative dose are indicated above. Prior to administering the radiation, the patient underwent a verification therapeutic radiology simulation-aided field setting defining relevant normal and abnormal target anatomy and acquiring images with a separate and distinct diagnostic high-frequency ultrasound to delineate tissues and determine whether to proceed with delivery of therapeutic, in addition to retrieve data necessary to develop an optimal radiation treatment process for the patient. The field placement simulation documents any change seen in overall tumor volume documented in the patient’s record, allows the clinician to indicate any needed changes in the treatment plan and/or prescription, provides diagnostic evaluation as the basis for performing the therapeutic procedure, and clearly identifies the information needed to decide to proceed with the therapeutic procedure. This process includes verification of the treatment port(s) and proper treatment positioning. All treatment ports were photographed within electronic medical records. The patient's lead blocking along with gross tumor volume and margin was confirmed. Considering superficial radiotherapy is clinical in setup, this requires the physician and radiation therapist to clarify the location interest being treated against initial images, ultrasound, pathology, and patient anatomy. Care was taken to ensure laboy treated were geometrically accurate and properly positioned using therapeutic radiology simulation-aided field setting verification per fraction. This process is also utilized to determine if any prescription or setup changes are necessary. These steps are therefore medically necessary to ensure safe and effective administration of radiation. Ongoing therapeutic radiology simulation-aided field setting verification is ordered throughout the course of therapy.  \\n\\nA high-frequency ultrasound image was acquired today for a two-dimensional evaluation of the tumor volume, depth, width, breadth, review of prior response to treatment, provide geometric accuracy of field placement, and determine whether to proceed with therapeutic delivery.\\n\\nThe field placement and ultrasound imaging, per fraction, is separate and distinct from the initial simulation and is an important task in providing safe administration of superficial radiation therapy. Physician evaluation of the ultrasound information will be ongoing throughout the course of treatment and is deemed medically necessary to ensure the efficacy of treatment, whether to proceed with therapeutic delivery, and determine any necessary changes. Today's images were evaluated for determination of continuation of treatment with the current plan or with necessary changes as appropriate. Additionally, the use of ultrasound visualization and targeted assessment allows the patient to be able to see his or her cancer(s) progress, encouraging the patient to complete and maintain compliance through the full course of prescribed radiotherapy. Per Dr. Ospina, continued ultrasound guidance and therapeutic radiology simulation-aided field setting verification per fraction is required for field placement, measurement of tumor depth, tissues evaluation, progress, acute effect monitoring, and determination for therapeutic treatment delivery is appropriate.
Energy (Kv): 100
Daily Dosage (Cgy): 269.10
Additional Comments (Add Customization Of Note Here): The patient states that the treatment area bled a bit this morning after cleaning it. A tube of Vaseline was provided to the patient today to take home.
Treatment Documentation: This patient has been treated today with image-guided superficial radiation therapy for non-melanoma skin cancer. Written informed consent has been previously obtained from this patient for this treatment. This consent is documented in the patient's chart. The patient gave verbal consent to continue treatment today. The patient was treated with a specific radiation dose and setup as prescribed by the provider listed on this visit note.  A Radiation Therapist performed administration of radiation under the supervision of a provider. The treatment parameters and cumulative dose are indicated above. Prior to administering the radiation, the patient underwent a verification therapeutic radiology simulation-aided field setting defining relevant normal and abnormal target anatomy and acquiring images with a separate and distinct diagnostic high-frequency ultrasound to delineate tissues and determine whether to proceed with delivery of therapeutic, in addition to retrieve data necessary to develop an optimal radiation treatment process for the patient. The field placement simulation documents any change seen in overall tumor volume documented in the patient’s record, allows the clinician to indicate any needed changes in the treatment plan and/or prescription, provides diagnostic evaluation as the basis for performing the therapeutic procedure, and clearly identifies the information needed to decide to proceed with the therapeutic procedure. This process includes verification of the treatment port(s) and proper treatment positioning. All treatment ports were photographed within electronic medical records. The patient's lead blocking along with gross tumor volume and margin was confirmed. Considering superficial radiotherapy is clinical in setup, this requires the physician and radiation therapist to clarify the location interest being treated against initial images, ultrasound, pathology, and patient anatomy. Care was taken to ensure laboy treated were geometrically accurate and properly positioned using therapeutic radiology simulation-aided field setting verification per fraction. This process is also utilized to determine if any prescription or setup changes are necessary. These steps are therefore medically necessary to ensure safe and effective administration of radiation. Ongoing therapeutic radiology simulation-aided field setting verification is ordered throughout the course of therapy.  \\n\\nA high-frequency ultrasound image was acquired today for a two-dimensional evaluation of the tumor volume, depth, width, breadth, review of prior response to treatment, provide geometric accuracy of field placement, and determine whether to proceed with therapeutic delivery.\\n\\nThe field placement and ultrasound imaging, per fraction, is separate and distinct from the initial simulation and is an important task in providing safe administration of superficial radiation therapy. Physician evaluation of the ultrasound information will be ongoing throughout the course of treatment and is deemed medically necessary to ensure the efficacy of treatment, whether to proceed with therapeutic delivery, and determine any necessary changes. Today's images were evaluated for determination of continuation of treatment with the current plan or with necessary changes as appropriate. Additionally, the use of ultrasound visualization and targeted assessment allows the patient to be able to see his or her cancer(s) progress, encouraging the patient to complete and maintain compliance through the full course of prescribed radiotherapy. Per Dr. Ospina, continued ultrasound guidance and therapeutic radiology simulation-aided field setting verification per fraction is required for field placement, measurement of tumor depth, tissues evaluation, progress, acute effect monitoring, and determination for therapeutic treatment delivery is appropriate.
Additional Comments (Add Customization Of Note Here): The patient was encouraged to continue using Vaseline multiple times a day on the treatment sites.
Daily Dosage (Cgy): 267.46
Total Cumulative Dose (Cgy): 5081.74
Ultrasound Used Text: High frequency ultrasound depth is 1.32 mm, which is 0.13 mm in difference from previous imaging.
Additional Comments (Add Customization Of Note Here): The patient states that there has been some bleeding at this treatment site over the weekend after cleaning or applying Vaseline.
Daily Dosage (Cgy): 264.04
Energy (Kv): 70
Total Cumulative Dose (Cgy): 5016.76
Ultrasound Used Text: High frequency ultrasound depth is 1.76 mm, which is 0.62 mm in difference from previous imaging.
Treatment Documentation: This patient has been treated today with image-guided superficial radiation therapy for non-melanoma skin cancer. Written informed consent has been previously obtained from this patient for this treatment. This consent is documented in the patient's chart. The patient gave verbal consent to continue treatment today. The patient was treated with a specific radiation dose and setup as prescribed by the provider listed on this visit note.  A Radiation Therapist performed administration of radiation under the supervision of a provider. The treatment parameters and cumulative dose are indicated above. Prior to administering the radiation, the patient underwent a verification therapeutic radiology simulation-aided field setting defining relevant normal and abnormal target anatomy and acquiring images with a separate and distinct diagnostic high-frequency ultrasound to delineate tissues and determine whether to proceed with delivery of therapeutic, in addition to retrieve data necessary to develop an optimal radiation treatment process for the patient. The field placement simulation documents any change seen in overall tumor volume documented in the patient’s record, allows the clinician to indicate any needed changes in the treatment plan and/or prescription, provides diagnostic evaluation as the basis for performing the therapeutic procedure, and clearly identifies the information needed to decide to proceed with the therapeutic procedure. This process includes verification of the treatment port(s) and proper treatment positioning. All treatment ports were photographed within electronic medical records. The patient's lead blocking along with gross tumor volume and margin was confirmed. Considering superficial radiotherapy is clinical in setup, this requires the physician and radiation therapist to clarify the location interest being treated against initial images, ultrasound, pathology, and patient anatomy. Care was taken to ensure laboy treated were geometrically accurate and properly positioned using therapeutic radiology simulation-aided field setting verification per fraction. This process is also utilized to determine if any prescription or setup changes are necessary. These steps are therefore medically necessary to ensure safe and effective administration of radiation. Ongoing therapeutic radiology simulation-aided field setting verification is ordered throughout the course of therapy.  \\n\\nA high-frequency ultrasound image was acquired today for a two-dimensional evaluation of the tumor volume, depth, width, breadth, review of prior response to treatment, provide geometric accuracy of field placement, and determine whether to proceed with therapeutic delivery.\\n\\nThe field placement and ultrasound imaging, per fraction, is separate and distinct from the initial simulation and is an important task in providing safe administration of superficial radiation therapy. Physician evaluation of the ultrasound information will be ongoing throughout the course of treatment and is deemed medically necessary to ensure the efficacy of treatment, whether to proceed with therapeutic delivery, and determine any necessary changes. Today's images were evaluated for determination of continuation of treatment with the current plan or with necessary changes as appropriate. Additionally, the use of ultrasound visualization and targeted assessment allows the patient to be able to see his or her cancer(s) progress, encouraging the patient to complete and maintain compliance through the full course of prescribed radiotherapy. Per Dr. Ospina, continued ultrasound guidance and therapeutic radiology simulation-aided field setting verification per fraction is required for field placement, measurement of tumor depth, tissues evaluation, progress, acute effect monitoring, and determination for therapeutic treatment delivery is appropriate.
Treatment Documentation: This patient has been treated today with image-guided superficial radiation therapy for non-melanoma skin cancer. Written informed consent has been previously obtained from this patient for this treatment. This consent is documented in the patient's chart. The patient gave verbal consent to continue treatment today. The patient was treated with a specific radiation dose and setup as prescribed by the provider listed on this visit note.  A Radiation Therapist performed administration of radiation under the supervision of a provider. The treatment parameters and cumulative dose are indicated above. Prior to administering the radiation, the patient underwent a verification therapeutic radiology simulation-aided field setting defining relevant normal and abnormal target anatomy and acquiring images with a separate and distinct diagnostic high-frequency ultrasound to delineate tissues and determine whether to proceed with delivery of therapeutic, in addition to retrieve data necessary to develop an optimal radiation treatment process for the patient. The field placement simulation documents any change seen in overall tumor volume documented in the patient’s record, allows the clinician to indicate any needed changes in the treatment plan and/or prescription, provides diagnostic evaluation as the basis for performing the therapeutic procedure, and clearly identifies the information needed to decide to proceed with the therapeutic procedure. This process includes verification of the treatment port(s) and proper treatment positioning. All treatment ports were photographed within electronic medical records. The patient's lead blocking along with gross tumor volume and margin was confirmed. Considering superficial radiotherapy is clinical in setup, this requires the physician and radiation therapist to clarify the location interest being treated against initial images, ultrasound, pathology, and patient anatomy. Care was taken to ensure laboy treated were geometrically accurate and properly positioned using therapeutic radiology simulation-aided field setting verification per fraction. This process is also utilized to determine if any prescription or setup changes are necessary. These steps are therefore medically necessary to ensure safe and effective administration of radiation. Ongoing therapeutic radiology simulation-aided field setting verification is ordered throughout the course of therapy.  \\n\\nA high-frequency ultrasound image was acquired today for a two-dimensional evaluation of the tumor volume, depth, width, breadth, review of prior response to treatment, provide geometric accuracy of field placement, and determine whether to proceed with therapeutic delivery.\\n\\nThe field placement and ultrasound imaging, per fraction, is separate and distinct from the initial simulation and is an important task in providing safe administration of superficial radiation therapy. Physician evaluation of the ultrasound information will be ongoing throughout the course of treatment and is deemed medically necessary to ensure the efficacy of treatment, whether to proceed with therapeutic delivery, and determine any necessary changes. Today's images were evaluated for determination of continuation of treatment with the current plan or with necessary changes as appropriate. Additionally, the use of ultrasound visualization and targeted assessment allows the patient to be able to see his or her cancer(s) progress, encouraging the patient to complete and maintain compliance through the full course of prescribed radiotherapy. Per Dr. Ospina, continued ultrasound guidance and therapeutic radiology simulation-aided field setting verification per fraction is required for field placement, measurement of tumor depth, tissues evaluation, progress, acute effect monitoring, and determination for therapeutic treatment delivery is appropriate.
Additional Comments (Add Customization Of Note Here): The patient states the treatment site has a \"twinge\" sensation once and awhile however he suspects because he moves that part of his face often with talking and smiling.
Ultrasound Used Text: High frequency ultrasound depth is 1.85 mm, which is 0.25 mm in difference from previous imaging.

## 2023-10-02 NOTE — PROCEDURE: IMAGE GUIDED - SUPERFICIAL RADIOTHERAPY: EVALUATION VISIT
Was Ultrasound Performed Today?: No
Ultrasound Used Text: Ultrasound depth is mm.
Assessment: Appropriate reaction
Evaluation Plan: The patient is undergoing superficial radiation therapy for skin cancer and presents for weekly evaluation and management. Per protocol and as documented on the flow sheet, the patient was questioned as to subjective redness, pruritus, pain, drainage, fatigue, or any other symptoms. Objectively, the radiation area was evaluated with regards to erythema, atrophy, scale, crusting, erosion, ulceration, edema, purpura, tenderness, warmth, drainage, and any other findings. The plan was extensively reviewed including dose and dosing schedule. The simulation and clinical setup were also reviewed as were external and any internal shields and based on this review the appropriateness and sufficiency of treatment was determined.
Is This Visit For Evaluation During Treatment Or Follow Up Post Treatment?: evaluation
Bill For Evaluation (24125)?: Yes
Radiation Therapy Oncology Group (Rtog) Score: 4
Ultrasound Not Used Text: Ultrasound was not performed today due to
Radiation Therapy Oncology Group (Rtog) Score: 1

## 2023-10-03 ENCOUNTER — APPOINTMENT (OUTPATIENT)
Dept: URBAN - METROPOLITAN AREA CLINIC 259 | Age: 83
Setting detail: DERMATOLOGY
End: 2023-10-09

## 2023-10-03 PROBLEM — C44.01 BASAL CELL CARCINOMA OF SKIN OF LIP: Status: ACTIVE | Noted: 2023-10-03

## 2023-10-03 PROBLEM — C44.319 BASAL CELL CARCINOMA OF SKIN OF OTHER PARTS OF FACE: Status: ACTIVE | Noted: 2023-10-03

## 2023-10-03 PROBLEM — C44.311 BASAL CELL CARCINOMA OF SKIN OF NOSE: Status: ACTIVE | Noted: 2023-10-03

## 2023-10-03 PROBLEM — C44.399 OTHER SPECIFIED MALIGNANT NEOPLASM OF SKIN OF OTHER PARTS OF FACE: Status: ACTIVE | Noted: 2023-10-03

## 2023-10-03 PROCEDURE — G6001 ECHO GUIDANCE RADIOTHERAPY: HCPCS | Mod: XU

## 2023-10-03 PROCEDURE — 77401 RADIATION TX DELIVERY SUPFC: CPT

## 2023-10-03 PROCEDURE — 77280 THER RAD SIMULAJ FIELD SMPL: CPT

## 2023-10-03 PROCEDURE — OTHER IMAGE GUIDED - SUPERFICIAL RADIOTHERAPY: TREATMENT VISIT: OTHER

## 2023-10-03 PROCEDURE — OTHER IMAGE GUIDED - SUPERFICIAL RADIOTHERAPY: OTHER

## 2023-10-03 NOTE — PROCEDURE: IMAGE GUIDED - SUPERFICIAL RADIOTHERAPY: TREATMENT VISIT
Calculate Total Cumulative Dose Automatically Or Manually: Manually
Show Ultrasound In Note?: Yes
Total Cumulative Dose (Cgy): 5382.00
Additional Change To Daily Dosage Administered Mid Treatment?: No
Add X Modifier?: JURADO - Unusual Non-Overlapping Service
Ultrasound Used Text: High frequency ultrasound depth is 2.22 mm, which is 0.45 mm in difference from previous imaging.
Prescription Used: 1
Ultrasound Not Used Text: Ultrasound was not performed today due to
Fraction Number: 20
Treatment Documentation: This patient has been treated today with image-guided superficial radiation therapy for non-melanoma skin cancer. Written informed consent has been previously obtained from this patient for this treatment. This consent is documented in the patient's chart. The patient gave verbal consent to continue treatment today. The patient was treated with a specific radiation dose and setup as prescribed by the provider listed on this visit note.  A Radiation Therapist performed administration of radiation under the supervision of a provider. The treatment parameters and cumulative dose are indicated above. Prior to administering the radiation, the patient underwent a verification therapeutic radiology simulation-aided field setting defining relevant normal and abnormal target anatomy and acquiring images with a separate and distinct diagnostic high-frequency ultrasound to delineate tissues and determine whether to proceed with delivery of therapeutic, in addition to retrieve data necessary to develop an optimal radiation treatment process for the patient. The field placement simulation documents any change seen in overall tumor volume documented in the patient’s record, allows the clinician to indicate any needed changes in the treatment plan and/or prescription, provides diagnostic evaluation as the basis for performing the therapeutic procedure, and clearly identifies the information needed to decide to proceed with the therapeutic procedure. This process includes verification of the treatment port(s) and proper treatment positioning. All treatment ports were photographed within electronic medical records. The patient's lead blocking along with gross tumor volume and margin was confirmed. Considering superficial radiotherapy is clinical in setup, this requires the physician and radiation therapist to clarify the location interest being treated against initial images, ultrasound, pathology, and patient anatomy. Care was taken to ensure laboy treated were geometrically accurate and properly positioned using therapeutic radiology simulation-aided field setting verification per fraction. This process is also utilized to determine if any prescription or setup changes are necessary. These steps are therefore medically necessary to ensure safe and effective administration of radiation. Ongoing therapeutic radiology simulation-aided field setting verification is ordered throughout the course of therapy.  \\n\\nA high-frequency ultrasound image was acquired today for a two-dimensional evaluation of the tumor volume, depth, width, breadth, review of prior response to treatment, provide geometric accuracy of field placement, and determine whether to proceed with therapeutic delivery.\\n\\nThe field placement and ultrasound imaging, per fraction, is separate and distinct from the initial simulation and is an important task in providing safe administration of superficial radiation therapy. Physician evaluation of the ultrasound information will be ongoing throughout the course of treatment and is deemed medically necessary to ensure the efficacy of treatment, whether to proceed with therapeutic delivery, and determine any necessary changes. Today's images were evaluated for determination of continuation of treatment with the current plan or with necessary changes as appropriate. Additionally, the use of ultrasound visualization and targeted assessment allows the patient to be able to see his or her cancer(s) progress, encouraging the patient to complete and maintain compliance through the full course of prescribed radiotherapy. Per Dr. Ospina, continued ultrasound guidance and therapeutic radiology simulation-aided field setting verification per fraction is required for field placement, measurement of tumor depth, tissues evaluation, progress, acute effect monitoring, and determination for therapeutic treatment delivery is appropriate.
Energy (Kv): 100
Daily Dosage (Cgy): 269.10
Additional Comments (Add Customization Of Note Here): The patient is doing well today without any questions or concerns regarding his treatments or aftercare.
Treatment Documentation: This patient has been treated today with image-guided superficial radiation therapy for non-melanoma skin cancer. Written informed consent has been previously obtained from this patient for this treatment. This consent is documented in the patient's chart. The patient gave verbal consent to continue treatment today. The patient was treated with a specific radiation dose and setup as prescribed by the provider listed on this visit note.  A Radiation Therapist performed administration of radiation under the supervision of a provider. The treatment parameters and cumulative dose are indicated above. Prior to administering the radiation, the patient underwent a verification therapeutic radiology simulation-aided field setting defining relevant normal and abnormal target anatomy and acquiring images with a separate and distinct diagnostic high-frequency ultrasound to delineate tissues and determine whether to proceed with delivery of therapeutic, in addition to retrieve data necessary to develop an optimal radiation treatment process for the patient. The field placement simulation documents any change seen in overall tumor volume documented in the patient’s record, allows the clinician to indicate any needed changes in the treatment plan and/or prescription, provides diagnostic evaluation as the basis for performing the therapeutic procedure, and clearly identifies the information needed to decide to proceed with the therapeutic procedure. This process includes verification of the treatment port(s) and proper treatment positioning. All treatment ports were photographed within electronic medical records. The patient's lead blocking along with gross tumor volume and margin was confirmed. Considering superficial radiotherapy is clinical in setup, this requires the physician and radiation therapist to clarify the location interest being treated against initial images, ultrasound, pathology, and patient anatomy. Care was taken to ensure laboy treated were geometrically accurate and properly positioned using therapeutic radiology simulation-aided field setting verification per fraction. This process is also utilized to determine if any prescription or setup changes are necessary. These steps are therefore medically necessary to ensure safe and effective administration of radiation. Ongoing therapeutic radiology simulation-aided field setting verification is ordered throughout the course of therapy.  \\n\\nA high-frequency ultrasound image was acquired today for a two-dimensional evaluation of the tumor volume, depth, width, breadth, review of prior response to treatment, provide geometric accuracy of field placement, and determine whether to proceed with therapeutic delivery.\\n\\nThe field placement and ultrasound imaging, per fraction, is separate and distinct from the initial simulation and is an important task in providing safe administration of superficial radiation therapy. Physician evaluation of the ultrasound information will be ongoing throughout the course of treatment and is deemed medically necessary to ensure the efficacy of treatment, whether to proceed with therapeutic delivery, and determine any necessary changes. Today's images were evaluated for determination of continuation of treatment with the current plan or with necessary changes as appropriate. Additionally, the use of ultrasound visualization and targeted assessment allows the patient to be able to see his or her cancer(s) progress, encouraging the patient to complete and maintain compliance through the full course of prescribed radiotherapy. Per Dr. Ospina, continued ultrasound guidance and therapeutic radiology simulation-aided field setting verification per fraction is required for field placement, measurement of tumor depth, tissues evaluation, progress, acute effect monitoring, and determination for therapeutic treatment delivery is appropriate.
Daily Dosage (Cgy): 267.46
Total Cumulative Dose (Cgy): 5349.20
Ultrasound Used Text: High frequency ultrasound depth is 1.21 mm, which is 0.11 mm in difference from previous imaging.
Daily Dosage (Cgy): 264.04
Energy (Kv): 70
Total Cumulative Dose (Cgy): 5280.80
Ultrasound Used Text: High frequency ultrasound depth is 1.53 mm, which is 0.53 mm in difference from previous imaging.
Treatment Documentation: This patient has been treated today with image-guided superficial radiation therapy for non-melanoma skin cancer. Written informed consent has been previously obtained from this patient for this treatment. This consent is documented in the patient's chart. The patient gave verbal consent to continue treatment today. The patient was treated with a specific radiation dose and setup as prescribed by the provider listed on this visit note.  A Radiation Therapist performed administration of radiation under the supervision of a provider. The treatment parameters and cumulative dose are indicated above. Prior to administering the radiation, the patient underwent a verification therapeutic radiology simulation-aided field setting defining relevant normal and abnormal target anatomy and acquiring images with a separate and distinct diagnostic high-frequency ultrasound to delineate tissues and determine whether to proceed with delivery of therapeutic, in addition to retrieve data necessary to develop an optimal radiation treatment process for the patient. The field placement simulation documents any change seen in overall tumor volume documented in the patient’s record, allows the clinician to indicate any needed changes in the treatment plan and/or prescription, provides diagnostic evaluation as the basis for performing the therapeutic procedure, and clearly identifies the information needed to decide to proceed with the therapeutic procedure. This process includes verification of the treatment port(s) and proper treatment positioning. All treatment ports were photographed within electronic medical records. The patient's lead blocking along with gross tumor volume and margin was confirmed. Considering superficial radiotherapy is clinical in setup, this requires the physician and radiation therapist to clarify the location interest being treated against initial images, ultrasound, pathology, and patient anatomy. Care was taken to ensure laboy treated were geometrically accurate and properly positioned using therapeutic radiology simulation-aided field setting verification per fraction. This process is also utilized to determine if any prescription or setup changes are necessary. These steps are therefore medically necessary to ensure safe and effective administration of radiation. Ongoing therapeutic radiology simulation-aided field setting verification is ordered throughout the course of therapy.  \\n\\nA high-frequency ultrasound image was acquired today for a two-dimensional evaluation of the tumor volume, depth, width, breadth, review of prior response to treatment, provide geometric accuracy of field placement, and determine whether to proceed with therapeutic delivery.\\n\\nThe field placement and ultrasound imaging, per fraction, is separate and distinct from the initial simulation and is an important task in providing safe administration of superficial radiation therapy. Physician evaluation of the ultrasound information will be ongoing throughout the course of treatment and is deemed medically necessary to ensure the efficacy of treatment, whether to proceed with therapeutic delivery, and determine any necessary changes. Today's images were evaluated for determination of continuation of treatment with the current plan or with necessary changes as appropriate. Additionally, the use of ultrasound visualization and targeted assessment allows the patient to be able to see his or her cancer(s) progress, encouraging the patient to complete and maintain compliance through the full course of prescribed radiotherapy. Per Dr. Ospina, continued ultrasound guidance and therapeutic radiology simulation-aided field setting verification per fraction is required for field placement, measurement of tumor depth, tissues evaluation, progress, acute effect monitoring, and determination for therapeutic treatment delivery is appropriate.
Ultrasound Used Text: High frequency ultrasound depth is 1.41 mm, which is 0.44 mm in difference from previous imaging.

## 2023-10-07 ENCOUNTER — APPOINTMENT (OUTPATIENT)
Dept: URBAN - METROPOLITAN AREA CLINIC 259 | Age: 83
Setting detail: DERMATOLOGY
End: 2023-10-07

## 2023-10-07 PROBLEM — C44.399 OTHER SPECIFIED MALIGNANT NEOPLASM OF SKIN OF OTHER PARTS OF FACE: Status: ACTIVE | Noted: 2023-10-07

## 2023-10-07 PROBLEM — C44.01 BASAL CELL CARCINOMA OF SKIN OF LIP: Status: ACTIVE | Noted: 2023-10-07

## 2023-10-07 PROBLEM — C44.319 BASAL CELL CARCINOMA OF SKIN OF OTHER PARTS OF FACE: Status: ACTIVE | Noted: 2023-10-07

## 2023-10-07 PROBLEM — C44.311 BASAL CELL CARCINOMA OF SKIN OF NOSE: Status: ACTIVE | Noted: 2023-10-07

## 2023-10-07 PROCEDURE — 77336 RADIATION PHYSICS CONSULT: CPT

## 2023-10-07 PROCEDURE — OTHER IMAGE GUIDED - SUPERFICIAL RADIOTHERAPY: OTHER

## 2023-10-07 NOTE — PROCEDURE: IMAGE GUIDED - SUPERFICIAL RADIOTHERAPY
Body Location Override (Optional): Left Nasal Ala
Detail Level: Detailed
Pathology Override (Pathology Will Render As Diagnosis Name If Left Blank): Primary Infiltrative Basal Cell Carcinoma
Field Number: 1
Lesion Dimensions-X Axis In Cm: 0.8
Lesion Dimensions-Y Axis In Cm: 0.9
Lesion Margin Size In Cm: 0.5
Applicator Size In Cm: 1.5 cm
Energy (Kv): 100
Treatment Time (Min): 0.46
Time, Dose, Fractionation Factor (Tdf) For Prescription 1: 99
Daily Dose (Cgy): 269.10
Number Of Fractions For Prescription 1: 20
Treatments Per Week: 4
Total Dose For Prescription 1 (Cgy): 5382.00
Add A Second Prescription?: No
Additional Fraction(S) Needed:: 0
Add Physics Consultation: Yes
Physics Consultation Performed For Fractions:: 15-20
Physics Documentation: Per the request of Dr. Ospina, continuing medical physics review as per radiotherapy standard of care post every 5th fraction for patient, including assessment of treatment parameters,  of dose delivery, and review of patient treatment documentation in support of the provider, to ensure efficacy and continued safe delivery of radiotherapy. Included in physics check is review of patient setup information, all pertinent simulation and treatment photographs checks, prescription, dose calculation verification, per fraction dose charted correctly, elapsed days and treatment days correctly charted, cumulative dose correct, and review of any prescription changes. Patient was not present, nor was it necessary for the patient to be present for weekly physics review and no other superficial radiotherapy services were rendered on this day. Continued medical physics review post every 5th fraction of therapy is requested by provider for appropriate radiotherapy management and is deemed medically necessary and standard of care.
Body Location Override (Optional): Left Central Zygoma
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
Field Number: 2
Lesion Dimensions-X Axis In Cm: 0.7
Shield Size In Cm: 1.7 X 1.7
Applicator Size In Cm: 2.5 cm
Treatment Time (Min): 0.43
Time, Dose, Fractionation Factor (Tdf) For Prescription 1: 98
Daily Dose (Cgy): 267.46
Total Dose For Prescription 1 (Cgy): 5349.20
Body Location Override (Optional): Left Superior Medial Forehead
Pathology Override (Pathology Will Render As Diagnosis Name If Left Blank): Primary Nodular Basal Cell Carcinoma
Field Number: 3
Shield Size In Cm: 2.0 x 2.0
Energy (Kv): 70
Treatment Time (Min): 0.41
Time, Dose, Fractionation Factor (Tdf) For Prescription 1: 96
Daily Dose (Cgy): 264.04
Total Dose For Prescription 1 (Cgy): 5280.80
Physics Documentation: Per the request of Dr. Ospina, continuing medical physics review as per radiotherapy standard of care post every 5th fraction for patient, including assessment of treatment parameters,  of dose delivery, and review of patient treatment documentation in support of the provider, to ensure efficacy and continued safe delivery of radiotherapy. Included in physics check is review of patient setup information, all pertinent simulation and treatment photographs checks, prescription, dose calculation verification, per fraction dose charted correctly, elapsed days and treatment days correctly charted, cumulative dose correct, and review of any prescription changes. Patient was not present, nor was it necessary for the patient to be present for weekly physics review and no other superficial radiotherapy services were rendered on this day. Continued medical physics review post every 5th fraction of therapy is requested by provider for appropriate radiotherapy management and is deemed medically necessary and standard of care.
Body Location Override (Optional): Right Nasolabial Fold
Physics Documentation: Per the request of Dr. Ospina, continuing medical physics review as per radiotherapy standard of care post every 5th fraction for patient, including assessment of treatment parameters,  of dose delivery, and review of patient treatment documentation in support of the provider, to ensure efficacy and continued safe delivery of radiotherapy. Included in physics check is review of patient setup information, all pertinent simulation and treatment photographs checks, prescription, dose calculation verification, per fraction dose charted correctly, elapsed days and treatment days correctly charted, cumulative dose correct, and review of any prescription changes. Patient was not present, nor was it necessary for the patient to be present for weekly physics review and no other superficial radiotherapy services were rendered on this day. Continued medical physics review post every 5th fraction of therapy is requested by provider for appropriate radiotherapy management and is deemed medically necessary and standard of care.

## 2023-11-01 ENCOUNTER — APPOINTMENT (OUTPATIENT)
Dept: URBAN - METROPOLITAN AREA CLINIC 259 | Age: 83
Setting detail: DERMATOLOGY
End: 2023-11-01

## 2023-11-01 PROBLEM — C44.399 OTHER SPECIFIED MALIGNANT NEOPLASM OF SKIN OF OTHER PARTS OF FACE: Status: ACTIVE | Noted: 2023-11-01

## 2023-11-01 PROBLEM — C44.319 BASAL CELL CARCINOMA OF SKIN OF OTHER PARTS OF FACE: Status: ACTIVE | Noted: 2023-11-01

## 2023-11-01 PROBLEM — C44.01 BASAL CELL CARCINOMA OF SKIN OF LIP: Status: ACTIVE | Noted: 2023-11-01

## 2023-11-01 PROBLEM — C44.311 BASAL CELL CARCINOMA OF SKIN OF NOSE: Status: ACTIVE | Noted: 2023-11-01

## 2023-11-01 PROCEDURE — OTHER IMAGE GUIDED - SUPERFICIAL RADIOTHERAPY: EVALUATION VISIT: OTHER

## 2023-11-01 PROCEDURE — OTHER IMAGE GUIDED - SUPERFICIAL RADIOTHERAPY: OTHER

## 2023-11-01 PROCEDURE — 99213 OFFICE O/P EST LOW 20 MIN: CPT | Mod: 25

## 2023-11-01 PROCEDURE — G6001 ECHO GUIDANCE RADIOTHERAPY: HCPCS

## 2023-11-01 NOTE — PROCEDURE: IMAGE GUIDED - SUPERFICIAL RADIOTHERAPY: EVALUATION VISIT
Was Ultrasound Performed Today?: No
Ultrasound Used Text: Ultrasound depth is mm.
Assessment: Appropriate reaction
Evaluation Plan: The patient is undergoing superficial radiation therapy for skin cancer and presents for weekly evaluation and management. Per protocol and as documented on the flow sheet, the patient was questioned as to subjective redness, pruritus, pain, drainage, fatigue, or any other symptoms. Objectively, the radiation area was evaluated with regards to erythema, atrophy, scale, crusting, erosion, ulceration, edema, purpura, tenderness, warmth, drainage, and any other findings. The plan was extensively reviewed including dose and dosing schedule. The simulation and clinical setup were also reviewed as were external and any internal shields and based on this review the appropriateness and sufficiency of treatment was determined.
Is This Visit For Evaluation During Treatment Or Follow Up Post Treatment?: evaluation
Bill For Evaluation (31357)?: Yes
Radiation Therapy Oncology Group (Rtog) Score: 4
Ultrasound Not Used Text: Ultrasound was not performed today due to
Statement Selected
Radiation Therapy Oncology Group (Rtog) Score: 1

## 2024-01-03 ENCOUNTER — APPOINTMENT (OUTPATIENT)
Dept: URBAN - METROPOLITAN AREA CLINIC 259 | Age: 84
Setting detail: DERMATOLOGY
End: 2024-01-04

## 2024-01-03 PROBLEM — C44.319 BASAL CELL CARCINOMA OF SKIN OF OTHER PARTS OF FACE: Status: ACTIVE | Noted: 2024-01-03

## 2024-01-03 PROBLEM — C44.311 BASAL CELL CARCINOMA OF SKIN OF NOSE: Status: ACTIVE | Noted: 2024-01-03

## 2024-01-03 PROBLEM — C44.01 BASAL CELL CARCINOMA OF SKIN OF LIP: Status: ACTIVE | Noted: 2024-01-03

## 2024-01-03 PROBLEM — C44.399 OTHER SPECIFIED MALIGNANT NEOPLASM OF SKIN OF OTHER PARTS OF FACE: Status: ACTIVE | Noted: 2024-01-03

## 2024-01-03 PROCEDURE — OTHER IMAGE GUIDED - SUPERFICIAL RADIOTHERAPY: OTHER

## 2024-01-03 PROCEDURE — G6001 ECHO GUIDANCE RADIOTHERAPY: HCPCS

## 2024-01-03 PROCEDURE — 99213 OFFICE O/P EST LOW 20 MIN: CPT | Mod: 25
